# Patient Record
Sex: FEMALE | Race: WHITE | NOT HISPANIC OR LATINO | Employment: OTHER | URBAN - METROPOLITAN AREA
[De-identification: names, ages, dates, MRNs, and addresses within clinical notes are randomized per-mention and may not be internally consistent; named-entity substitution may affect disease eponyms.]

---

## 2019-05-13 ENCOUNTER — LAB REQUISITION (OUTPATIENT)
Dept: LAB | Facility: HOSPITAL | Age: 84
End: 2019-05-13

## 2019-05-13 DIAGNOSIS — Z00.00 ROUTINE GENERAL MEDICAL EXAMINATION AT A HEALTH CARE FACILITY: ICD-10-CM

## 2019-05-13 LAB
BASOPHILS # BLD AUTO: 0.05 10*3/MM3 (ref 0–0.2)
BASOPHILS NFR BLD AUTO: 0.8 % (ref 0–1.5)
DEPRECATED RDW RBC AUTO: 46.7 FL (ref 37–54)
EOSINOPHIL # BLD AUTO: 0.27 10*3/MM3 (ref 0–0.4)
EOSINOPHIL NFR BLD AUTO: 4.3 % (ref 0.3–6.2)
ERYTHROCYTE [DISTWIDTH] IN BLOOD BY AUTOMATED COUNT: 13.1 % (ref 12.3–15.4)
HCT VFR BLD AUTO: 38.5 % (ref 34–46.6)
HGB BLD-MCNC: 12 G/DL (ref 12–15.9)
IMM GRANULOCYTES # BLD AUTO: 0.03 10*3/MM3 (ref 0–0.05)
IMM GRANULOCYTES NFR BLD AUTO: 0.5 % (ref 0–0.5)
LYMPHOCYTES # BLD AUTO: 1.58 10*3/MM3 (ref 0.7–3.1)
LYMPHOCYTES NFR BLD AUTO: 25 % (ref 19.6–45.3)
MCH RBC QN AUTO: 30.2 PG (ref 26.6–33)
MCHC RBC AUTO-ENTMCNC: 31.2 G/DL (ref 31.5–35.7)
MCV RBC AUTO: 97 FL (ref 79–97)
MONOCYTES # BLD AUTO: 0.53 10*3/MM3 (ref 0.1–0.9)
MONOCYTES NFR BLD AUTO: 8.4 % (ref 5–12)
NEUTROPHILS # BLD AUTO: 3.85 10*3/MM3 (ref 1.7–7)
NEUTROPHILS NFR BLD AUTO: 61 % (ref 42.7–76)
NRBC BLD AUTO-RTO: 0 /100 WBC (ref 0–0.2)
PLATELET # BLD AUTO: 392 10*3/MM3 (ref 140–450)
PMV BLD AUTO: 9 FL (ref 6–12)
RBC # BLD AUTO: 3.97 10*6/MM3 (ref 3.77–5.28)
WBC NRBC COR # BLD: 6.31 10*3/MM3 (ref 3.4–10.8)

## 2019-05-13 PROCEDURE — 85025 COMPLETE CBC W/AUTO DIFF WBC: CPT

## 2021-10-15 ENCOUNTER — APPOINTMENT (OUTPATIENT)
Dept: CT IMAGING | Facility: HOSPITAL | Age: 86
End: 2021-10-15

## 2021-10-15 ENCOUNTER — HOSPITAL ENCOUNTER (INPATIENT)
Facility: HOSPITAL | Age: 86
LOS: 4 days | Discharge: NURSING, REHAB OR LONG-TERM CARE | End: 2021-10-19
Attending: EMERGENCY MEDICINE | Admitting: INTERNAL MEDICINE

## 2021-10-15 DIAGNOSIS — Z86.73 HISTORY OF CVA (CEREBROVASCULAR ACCIDENT): ICD-10-CM

## 2021-10-15 DIAGNOSIS — I63.81 CEREBROVASCULAR ACCIDENT (CVA) DUE TO OCCLUSION OF SMALL ARTERY (HCC): Primary | ICD-10-CM

## 2021-10-15 DIAGNOSIS — I67.1 INTRACRANIAL ANEURYSM: ICD-10-CM

## 2021-10-15 PROBLEM — I10 HTN (HYPERTENSION): Status: ACTIVE | Noted: 2021-10-15

## 2021-10-15 LAB
ALBUMIN SERPL-MCNC: 4.8 G/DL (ref 3.5–5.2)
ALBUMIN/GLOB SERPL: 1.8 G/DL
ALP SERPL-CCNC: 73 U/L (ref 39–117)
ALT SERPL W P-5'-P-CCNC: 15 U/L (ref 1–33)
ANION GAP SERPL CALCULATED.3IONS-SCNC: 12 MMOL/L (ref 5–15)
AST SERPL-CCNC: 17 U/L (ref 1–32)
BASOPHILS # BLD AUTO: 0.02 10*3/MM3 (ref 0–0.2)
BASOPHILS NFR BLD AUTO: 0.2 % (ref 0–1.5)
BILIRUB SERPL-MCNC: 0.3 MG/DL (ref 0–1.2)
BUN SERPL-MCNC: 13 MG/DL (ref 8–23)
BUN/CREAT SERPL: 14.1 (ref 7–25)
CALCIUM SPEC-SCNC: 9.5 MG/DL (ref 8.2–9.6)
CHLORIDE SERPL-SCNC: 101 MMOL/L (ref 98–107)
CO2 SERPL-SCNC: 25 MMOL/L (ref 22–29)
CREAT SERPL-MCNC: 0.92 MG/DL (ref 0.57–1)
DEPRECATED RDW RBC AUTO: 42.1 FL (ref 37–54)
EOSINOPHIL # BLD AUTO: 0.02 10*3/MM3 (ref 0–0.4)
EOSINOPHIL NFR BLD AUTO: 0.2 % (ref 0.3–6.2)
ERYTHROCYTE [DISTWIDTH] IN BLOOD BY AUTOMATED COUNT: 13 % (ref 12.3–15.4)
GFR SERPL CREATININE-BSD FRML MDRD: 57 ML/MIN/1.73
GLOBULIN UR ELPH-MCNC: 2.7 GM/DL
GLUCOSE BLDC GLUCOMTR-MCNC: 110 MG/DL (ref 70–130)
GLUCOSE SERPL-MCNC: 111 MG/DL (ref 65–99)
HCT VFR BLD AUTO: 39.2 % (ref 34–46.6)
HGB BLD-MCNC: 12.8 G/DL (ref 12–15.9)
IMM GRANULOCYTES # BLD AUTO: 0.02 10*3/MM3 (ref 0–0.05)
IMM GRANULOCYTES NFR BLD AUTO: 0.2 % (ref 0–0.5)
INR PPP: 0.92 (ref 0.9–1.1)
LYMPHOCYTES # BLD AUTO: 0.93 10*3/MM3 (ref 0.7–3.1)
LYMPHOCYTES NFR BLD AUTO: 11.2 % (ref 19.6–45.3)
MCH RBC QN AUTO: 29.5 PG (ref 26.6–33)
MCHC RBC AUTO-ENTMCNC: 32.7 G/DL (ref 31.5–35.7)
MCV RBC AUTO: 90.3 FL (ref 79–97)
MONOCYTES # BLD AUTO: 0.36 10*3/MM3 (ref 0.1–0.9)
MONOCYTES NFR BLD AUTO: 4.3 % (ref 5–12)
NEUTROPHILS NFR BLD AUTO: 6.94 10*3/MM3 (ref 1.7–7)
NEUTROPHILS NFR BLD AUTO: 83.9 % (ref 42.7–76)
NRBC BLD AUTO-RTO: 0 /100 WBC (ref 0–0.2)
PLATELET # BLD AUTO: 352 10*3/MM3 (ref 140–450)
PMV BLD AUTO: 8.9 FL (ref 6–12)
POTASSIUM SERPL-SCNC: 4 MMOL/L (ref 3.5–5.2)
PROT SERPL-MCNC: 7.5 G/DL (ref 6–8.5)
PROTHROMBIN TIME: 12.2 SECONDS (ref 11.7–14.2)
QT INTERVAL: 389 MS
RBC # BLD AUTO: 4.34 10*6/MM3 (ref 3.77–5.28)
SARS-COV-2 RNA RESP QL NAA+PROBE: NOT DETECTED
SODIUM SERPL-SCNC: 138 MMOL/L (ref 136–145)
TROPONIN T SERPL-MCNC: <0.01 NG/ML (ref 0–0.03)
WBC # BLD AUTO: 8.29 10*3/MM3 (ref 3.4–10.8)

## 2021-10-15 PROCEDURE — 82962 GLUCOSE BLOOD TEST: CPT

## 2021-10-15 PROCEDURE — 84484 ASSAY OF TROPONIN QUANT: CPT | Performed by: EMERGENCY MEDICINE

## 2021-10-15 PROCEDURE — 93005 ELECTROCARDIOGRAM TRACING: CPT | Performed by: EMERGENCY MEDICINE

## 2021-10-15 PROCEDURE — 80053 COMPREHEN METABOLIC PANEL: CPT | Performed by: EMERGENCY MEDICINE

## 2021-10-15 PROCEDURE — 85025 COMPLETE CBC W/AUTO DIFF WBC: CPT | Performed by: EMERGENCY MEDICINE

## 2021-10-15 PROCEDURE — U0005 INFEC AGEN DETEC AMPLI PROBE: HCPCS | Performed by: EMERGENCY MEDICINE

## 2021-10-15 PROCEDURE — 99285 EMERGENCY DEPT VISIT HI MDM: CPT

## 2021-10-15 PROCEDURE — U0003 INFECTIOUS AGENT DETECTION BY NUCLEIC ACID (DNA OR RNA); SEVERE ACUTE RESPIRATORY SYNDROME CORONAVIRUS 2 (SARS-COV-2) (CORONAVIRUS DISEASE [COVID-19]), AMPLIFIED PROBE TECHNIQUE, MAKING USE OF HIGH THROUGHPUT TECHNOLOGIES AS DESCRIBED BY CMS-2020-01-R: HCPCS | Performed by: EMERGENCY MEDICINE

## 2021-10-15 PROCEDURE — 93010 ELECTROCARDIOGRAM REPORT: CPT | Performed by: INTERNAL MEDICINE

## 2021-10-15 PROCEDURE — 85610 PROTHROMBIN TIME: CPT | Performed by: EMERGENCY MEDICINE

## 2021-10-15 PROCEDURE — 70450 CT HEAD/BRAIN W/O DYE: CPT

## 2021-10-15 RX ORDER — ATORVASTATIN CALCIUM 20 MG/1
10 TABLET, FILM COATED ORAL NIGHTLY
Status: DISCONTINUED | OUTPATIENT
Start: 2021-10-15 | End: 2021-10-19 | Stop reason: HOSPADM

## 2021-10-15 RX ORDER — LEVETIRACETAM 250 MG/1
250 TABLET ORAL 2 TIMES DAILY
COMMUNITY
Start: 2021-09-17

## 2021-10-15 RX ORDER — METOPROLOL SUCCINATE 50 MG/1
50 TABLET, EXTENDED RELEASE ORAL
Status: DISCONTINUED | OUTPATIENT
Start: 2021-10-16 | End: 2021-10-19 | Stop reason: HOSPADM

## 2021-10-15 RX ORDER — AMLODIPINE BESYLATE AND ATORVASTATIN CALCIUM 10; 10 MG/1; MG/1
1 TABLET, FILM COATED ORAL DAILY
COMMUNITY
Start: 2021-09-23 | End: 2021-10-19 | Stop reason: HOSPADM

## 2021-10-15 RX ORDER — POTASSIUM CHLORIDE 750 MG/1
10 TABLET, FILM COATED, EXTENDED RELEASE ORAL DAILY
Status: DISCONTINUED | OUTPATIENT
Start: 2021-10-16 | End: 2021-10-19 | Stop reason: HOSPADM

## 2021-10-15 RX ORDER — ROSUVASTATIN CALCIUM 5 MG/1
5 TABLET, COATED ORAL DAILY
Status: DISCONTINUED | OUTPATIENT
Start: 2021-10-16 | End: 2021-10-15 | Stop reason: SDUPTHER

## 2021-10-15 RX ORDER — SODIUM CHLORIDE 0.9 % (FLUSH) 0.9 %
10 SYRINGE (ML) INJECTION AS NEEDED
Status: DISCONTINUED | OUTPATIENT
Start: 2021-10-15 | End: 2021-10-19 | Stop reason: HOSPADM

## 2021-10-15 RX ORDER — GABAPENTIN 100 MG/1
100 CAPSULE ORAL 3 TIMES DAILY
Status: DISCONTINUED | OUTPATIENT
Start: 2021-10-15 | End: 2021-10-16

## 2021-10-15 RX ORDER — CLOPIDOGREL BISULFATE 75 MG/1
75 TABLET ORAL DAILY
Status: DISCONTINUED | OUTPATIENT
Start: 2021-10-16 | End: 2021-10-19 | Stop reason: HOSPADM

## 2021-10-15 RX ORDER — GABAPENTIN 100 MG/1
200 CAPSULE ORAL 3 TIMES DAILY
Status: ON HOLD | COMMUNITY
Start: 2021-09-24 | End: 2021-10-19 | Stop reason: SDUPTHER

## 2021-10-15 RX ORDER — DIPHENOXYLATE HYDROCHLORIDE AND ATROPINE SULFATE 2.5; .025 MG/1; MG/1
1 TABLET ORAL DAILY
Status: DISCONTINUED | OUTPATIENT
Start: 2021-10-16 | End: 2021-10-19 | Stop reason: HOSPADM

## 2021-10-15 RX ORDER — ONDANSETRON 2 MG/ML
4 INJECTION INTRAMUSCULAR; INTRAVENOUS EVERY 6 HOURS PRN
Status: DISCONTINUED | OUTPATIENT
Start: 2021-10-15 | End: 2021-10-19 | Stop reason: HOSPADM

## 2021-10-15 RX ORDER — SODIUM CHLORIDE 9 MG/ML
75 INJECTION, SOLUTION INTRAVENOUS CONTINUOUS
Status: DISCONTINUED | OUTPATIENT
Start: 2021-10-15 | End: 2021-10-16

## 2021-10-15 RX ORDER — DIPHENOXYLATE HYDROCHLORIDE AND ATROPINE SULFATE 2.5; .025 MG/1; MG/1
1 TABLET ORAL DAILY
COMMUNITY

## 2021-10-15 RX ORDER — ROSUVASTATIN CALCIUM 5 MG/1
5 TABLET, COATED ORAL NIGHTLY
COMMUNITY
Start: 2021-09-28

## 2021-10-15 RX ORDER — ASPIRIN 81 MG/1
81 TABLET ORAL DAILY
Status: DISCONTINUED | OUTPATIENT
Start: 2021-10-16 | End: 2021-10-19 | Stop reason: HOSPADM

## 2021-10-15 RX ORDER — SODIUM CHLORIDE 0.9 % (FLUSH) 0.9 %
10 SYRINGE (ML) INJECTION EVERY 12 HOURS SCHEDULED
Status: DISCONTINUED | OUTPATIENT
Start: 2021-10-15 | End: 2021-10-19 | Stop reason: HOSPADM

## 2021-10-15 RX ORDER — AMLODIPINE BESYLATE 10 MG/1
10 TABLET ORAL NIGHTLY
Status: DISCONTINUED | OUTPATIENT
Start: 2021-10-15 | End: 2021-10-19 | Stop reason: HOSPADM

## 2021-10-15 RX ORDER — ASPIRIN 325 MG
325 TABLET ORAL ONCE
Status: COMPLETED | OUTPATIENT
Start: 2021-10-15 | End: 2021-10-15

## 2021-10-15 RX ORDER — CLOPIDOGREL BISULFATE 75 MG/1
75 TABLET ORAL DAILY
COMMUNITY
Start: 2021-09-20

## 2021-10-15 RX ORDER — LEVETIRACETAM 250 MG/1
250 TABLET ORAL EVERY 12 HOURS SCHEDULED
Status: DISCONTINUED | OUTPATIENT
Start: 2021-10-15 | End: 2021-10-19 | Stop reason: HOSPADM

## 2021-10-15 RX ADMIN — SODIUM CHLORIDE, PRESERVATIVE FREE 10 ML: 5 INJECTION INTRAVENOUS at 21:13

## 2021-10-15 RX ADMIN — ATORVASTATIN CALCIUM 10 MG: 20 TABLET, FILM COATED ORAL at 21:13

## 2021-10-15 RX ADMIN — AMLODIPINE BESYLATE 10 MG: 10 TABLET ORAL at 22:34

## 2021-10-15 RX ADMIN — ASPIRIN 325 MG: 325 TABLET ORAL at 16:17

## 2021-10-15 RX ADMIN — SODIUM CHLORIDE 75 ML/HR: 9 INJECTION, SOLUTION INTRAVENOUS at 21:12

## 2021-10-15 RX ADMIN — GABAPENTIN 100 MG: 100 CAPSULE ORAL at 21:13

## 2021-10-15 NOTE — ED PROVIDER NOTES
" EMERGENCY DEPARTMENT ENCOUNTER    Room Number:  18/18  Date of encounter:  10/15/2021  PCP: Provider, No Known  Historian: Patient, daughter at bedside    I used full protective equipment while examining this patient.  This includes face mask, gloves and protective eyewear.  I washed my hands before entering the room and immediately upon leaving the room      HPI:  Chief Complaint: Right-sided weakness  A complete HPI/ROS/PMH/PSH/SH/FH are unobtainable due to: None    Context: Dionne Chavez is a 89 y.o. female who presents to the ED c/o right-sided weakness.  Patient's had prior stroke with chronic right-sided weakness which is somewhat mild.  Symptoms worsened yesterday somewhere around noon and have gradually gotten worse.  Patient has almost no movement of the right leg and right arm.  Before she was able to walk using a brace and a walker.  Patient has had prior stroke and is on Plavix for anticoagulation.  She did take Plavix earlier today.  She denies headache, chest pain or shortness of breath and has been in her usual health.  Patient was at rehab up until 1 week ago when she came back home and is now living with her daughter who is a nurse at the [facility].      MEDICAL RECORD REVIEW  Patient was hospitalized at [facility] about 5 years ago for stroke.  According to the daughter she had a known intracerebral aneurysm but refused repair at that time given her age.  I did review patient's medications and they do include Plavix for anticoagulation and amlodipine for hypertension.  Patient does have a history of significant intra-abdominal surgery and has a history of \"short gut syndrome\".    PAST MEDICAL HISTORY  Active Ambulatory Problems     Diagnosis Date Noted   • No Active Ambulatory Problems     Resolved Ambulatory Problems     Diagnosis Date Noted   • No Resolved Ambulatory Problems     No Additional Past Medical History         PAST SURGICAL HISTORY  History reviewed. No pertinent surgical " history.      FAMILY HISTORY  History reviewed. No pertinent family history.      SOCIAL HISTORY  Social History     Socioeconomic History   • Marital status:          ALLERGIES  Patient has no allergy information on record.       REVIEW OF SYSTEMS  Review of Systems   Constitutional: Negative for fever.   HENT: Negative.  Negative for sore throat.    Eyes: Negative.    Respiratory: Negative.  Negative for cough.    Cardiovascular: Negative.  Negative for chest pain.   Gastrointestinal: Negative.    Genitourinary: Negative.  Negative for dysuria.   Musculoskeletal: Negative.  Negative for back pain.   Skin: Negative.  Negative for rash.   Neurological: Positive for weakness (As per HPI). Negative for headaches.   All other systems reviewed and are negative.          PHYSICAL EXAM    I have reviewed the triage vital signs and nursing notes.    ED Triage Vitals   Temp Heart Rate Resp BP SpO2   10/15/21 1338 10/15/21 1337 10/15/21 1337 10/15/21 1337 10/15/21 1337   96.9 °F (36.1 °C) 94 18 152/68 99 %      Temp src Heart Rate Source Patient Position BP Location FiO2 (%)   -- -- -- -- --              Physical Exam  GENERAL: Alert female in no obvious distress.  Triage vitals reviewed.  HENT: nares patent, atraumatic  EYES: no scleral icterus  CV: regular rhythm, regular rate no murmur  RESPIRATORY: normal effort, clear to auscultation bilaterally  ABDOMEN: soft, nontender to palpation  MUSCULOSKELETAL: no deformity-no segment swelling or tenderness to palpation  NEURO:   Mentation-awake alert and oriented x3  Speech-coherent and clear, no dysarthria or aphasia  Vision-grossly intact  Strength-moderate weakness of the right upper extremity and severe weakness of the right lower extremity  Sensation-grossly intact  Coordination-intact where not limited by weakness  SKIN: warm, dry      LAB RESULTS  Recent Results (from the past 24 hour(s))   Comprehensive Metabolic Panel    Collection Time: 10/15/21  2:02 PM     Specimen: Blood   Result Value Ref Range    Glucose 111 (H) 65 - 99 mg/dL    BUN 13 8 - 23 mg/dL    Creatinine 0.92 0.57 - 1.00 mg/dL    Sodium 138 136 - 145 mmol/L    Potassium 4.0 3.5 - 5.2 mmol/L    Chloride 101 98 - 107 mmol/L    CO2 25.0 22.0 - 29.0 mmol/L    Calcium 9.5 8.2 - 9.6 mg/dL    Total Protein 7.5 6.0 - 8.5 g/dL    Albumin 4.80 3.50 - 5.20 g/dL    ALT (SGPT) 15 1 - 33 U/L    AST (SGOT) 17 1 - 32 U/L    Alkaline Phosphatase 73 39 - 117 U/L    Total Bilirubin 0.3 0.0 - 1.2 mg/dL    eGFR Non African Amer 57 (L) >60 mL/min/1.73    Globulin 2.7 gm/dL    A/G Ratio 1.8 g/dL    BUN/Creatinine Ratio 14.1 7.0 - 25.0    Anion Gap 12.0 5.0 - 15.0 mmol/L   Protime-INR    Collection Time: 10/15/21  2:02 PM    Specimen: Blood   Result Value Ref Range    Protime 12.2 11.7 - 14.2 Seconds    INR 0.92 0.90 - 1.10   Troponin    Collection Time: 10/15/21  2:02 PM    Specimen: Blood   Result Value Ref Range    Troponin T <0.010 0.000 - 0.030 ng/mL   CBC Auto Differential    Collection Time: 10/15/21  2:02 PM    Specimen: Blood   Result Value Ref Range    WBC 8.29 3.40 - 10.80 10*3/mm3    RBC 4.34 3.77 - 5.28 10*6/mm3    Hemoglobin 12.8 12.0 - 15.9 g/dL    Hematocrit 39.2 34.0 - 46.6 %    MCV 90.3 79.0 - 97.0 fL    MCH 29.5 26.6 - 33.0 pg    MCHC 32.7 31.5 - 35.7 g/dL    RDW 13.0 12.3 - 15.4 %    RDW-SD 42.1 37.0 - 54.0 fl    MPV 8.9 6.0 - 12.0 fL    Platelets 352 140 - 450 10*3/mm3    Neutrophil % 83.9 (H) 42.7 - 76.0 %    Lymphocyte % 11.2 (L) 19.6 - 45.3 %    Monocyte % 4.3 (L) 5.0 - 12.0 %    Eosinophil % 0.2 (L) 0.3 - 6.2 %    Basophil % 0.2 0.0 - 1.5 %    Immature Grans % 0.2 0.0 - 0.5 %    Neutrophils, Absolute 6.94 1.70 - 7.00 10*3/mm3    Lymphocytes, Absolute 0.93 0.70 - 3.10 10*3/mm3    Monocytes, Absolute 0.36 0.10 - 0.90 10*3/mm3    Eosinophils, Absolute 0.02 0.00 - 0.40 10*3/mm3    Basophils, Absolute 0.02 0.00 - 0.20 10*3/mm3    Immature Grans, Absolute 0.02 0.00 - 0.05 10*3/mm3    nRBC 0.0 0.0 - 0.2 /100 WBC    COVID-19, [facility] IN-HOUSE CEPHEID/SIERRA NP SWAB IN TRANSPORT MEDIA 8-12 HR TAT - Swab, Nasopharynx    Collection Time: 10/15/21  2:02 PM    Specimen: Nasopharynx; Swab   Result Value Ref Range    COVID19 Not Detected Not Detected - Ref. Range   ECG 12 Lead    Collection Time: 10/15/21  2:19 PM   Result Value Ref Range    QT Interval 389 ms       Ordered the above labs and independently reviewed the results.      RADIOLOGY  CT Head Without Contrast    Result Date: 10/15/2021  CT HEAD WITHOUT CONTRAST  CLINICAL HISTORY: Acute onset right-sided weakness.  TECHNIQUE: CT scan of the head was obtained with 3 mm axial soft tissue algorithm and 2 mm bone algorithm images. No intravenous contrast was administered. Sagittal and coronal reconstructions were obtained.  COMPARISON: No previous similar studies are available for comparison.  FINDINGS:   There is no evidence for a calvarial fracture. There is no evidence for an acute extra-axial hemorrhage.  Within the inferior aspect of the right middle cranial fossa, there is a partially calcified lesion that is likely extra-axial in nature and measures up to approximately 2.7 x 1.4 x 1.5 cm in greatest dimensions that is likely representative of a partially calcified meningioma. Additionally, within the posterior fossa, anterior to the inferior portion of the right cerebellar hemisphere and right lateral to the pontomedullary junction, there is another extra-axial lesion which measures up to approximately 1.7 x 1.0 cm in greatest axial dimensions. This lesion is peripherally calcified and is within the immediate vicinity of the right vertebral artery. I suspect that the findings are representative of a large aneurysm arising from the right vertebral artery, although another potential possibility would be a meningioma at this site. I recommend further evaluation with either a CT angiogram or MRI/MRA study.  The ventricles, sulci, and cisterns are age appropriate. There is a focus  of encephalomalacia within the medial portion of the left frontal lobe which measures up to approximately 6.0 x 5.6 x 3.0 cm in greatest dimensions that is compatible with a chronic infarct within the left BIJAN distribution. There is an age-indeterminate lacune within the left lentiform nucleus which measures up to approximately 9 mm in diameter. There are mild-to-moderate changes of chronic small vessel ischemic phenomena. A chronic lacune is identified within the left caudate body extending to the adjacent corona radiata.  The extracranial structures are incidentally notable for a large sebaceous cyst within the left occipital scalp which measures up to approximately 1.7 x 4.3 cm in greatest axial dimensions.       There is an age-indeterminate lacune within the left lentiform nucleus. Otherwise, there is no convincing evidence to suggest an acute intracranial abnormality.  There are findings compatible with a chronic infarct within the medial portion of the left frontal lobe within the left BIJAN distribution and measures up to approximately 6.0 x 5.6 x 3.0 cm in greatest dimensions. Old lacunar disease is seen within the left caudate extending into the adjacent corona radiata.  There is a partially calcified mass within the inferior aspect of the right middle cranial fossa that is likely extra-axial in nature and likely representative of a partially calcified meningioma. Additionally, within the posterior fossa just anterior to the inferior aspect of the right cerebellar hemisphere along the right lateral aspect of the pontomedullary junction, there is an extra-axial peripherally calcified lesion that measures up to 1.7 x 1.0 cm in greatest axial dimensions. This lesion is within the immediate vicinity of the right vertebral artery and is felt to probably represent a large aneurysm arising from the right vertebral artery, although this could potentially represent a meningioma. Further evaluation could be obtained  with either an MRA/MRA study or a CT angiogram.  Incidental note is made of a large sebaceous cyst within the left occipital scalp.    Radiation dose reduction techniques were utilized, including automated exposure control and exposure modulation based on body size.  This report was finalized on 10/15/2021 4:27 PM by Dr. Alfredo Adame M.D.        I ordered the above noted radiological studies. Reviewed by me and discussed with radiologist.  See dictation for official radiology interpretation.      PROCEDURES  Procedures      MEDICATIONS GIVEN IN ER    Medications   sodium chloride 0.9 % flush 10 mL (has no administration in time range)   aspirin tablet 325 mg (325 mg Oral Given 10/15/21 1617)         PROGRESS, DATA ANALYSIS, CONSULTS, AND MEDICAL DECISION MAKING    All labs have been independently reviewed by me.  All radiology studies have been reviewed by me and discussed with radiologist dictating the report.   EKG's independently viewed and interpreted by me.  Discussion below represents my analysis of pertinent findings related to patient's condition, differential diagnosis, treatment plan and final disposition.      ED Course as of 10/15/21 1642   Fri Oct 15, 2021   [age] HMA-54-rcky-old female with history of prior stroke resulting in right-sided weakness presents with worsening right-sided weakness with onset greater than 24 hours ago.  Patient is not a TPA candidate or thrombectomy candidate due to onset greater than 24 hours.  I do suspect that patient will need to be hospitalized for work-up of new right-sided stroke. [DB]   1402 NIH stroke scale is 4.  3 for right leg, 1 for right arm [DB]   1437 EKG          EKG time: 1419  Rhythm/Rate: Sinus 86  P waves and WV: Normal P waves and WV intervals  QRS, axis: Normal axis, normal QRS  ST and T waves: Unremarkable ST and T waves    Interpreted Contemporaneously by me, independently viewed  No prior to compare   [DB]   1447 Labs are reviewed and are all  unremarkable.  Chemistries troponin and CBC fall within normal limits.  At this point we are waiting on CT scan of the head. [DB]   1608 I discussed CT scan of the head with Dr. Cerda.  He reports likely left lacunar infarct.  There is an old left cortical infarct.  There is a meningioma.  There is a large area near the left vertebral artery consistent with likely aneurysm.  Patient's daughter does report the patient does have a known aneurysm from 5 years ago that she did not want fixed with any intervention.  I shared results of testing with patient and her daughter.  Plan admission to the hospital.  Patient is already taken Plavix but will go ahead and give aspirin 325 per stroke protocol. [DB]   1641 Discussed evaluation of this patient with Dr. Childers who admit on behalf of [facility]. 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

## 2021-10-15 NOTE — ED TRIAGE NOTES
Pt from home comes to ER via [facility] pt has rt sided weakness arm and leg started started unknown time yesterday. Pt has previous stroke with weakness on rt side states weakness is worse.     Pt arrives in triage with mask on. Triage staff wearing N95 masks and goggles.

## 2021-10-15 NOTE — ED NOTES
This RN wearing all appropriate PPE during patient encounter. Hand hygiene performed before and during entering room.       Nadia Hernandez, STELLA  10/15/21 6153

## 2021-10-16 ENCOUNTER — APPOINTMENT (OUTPATIENT)
Dept: CARDIOLOGY | Facility: HOSPITAL | Age: 86
End: 2021-10-16

## 2021-10-16 PROBLEM — G40.909 SEIZURE DISORDER (HCC): Chronic | Status: ACTIVE | Noted: 2021-10-16

## 2021-10-16 LAB
ALBUMIN SERPL-MCNC: 4.3 G/DL (ref 3.5–5.2)
ALBUMIN/GLOB SERPL: 1.7 G/DL
ALP SERPL-CCNC: 63 U/L (ref 39–117)
ALT SERPL W P-5'-P-CCNC: 11 U/L (ref 1–33)
ANION GAP SERPL CALCULATED.3IONS-SCNC: 10.1 MMOL/L (ref 5–15)
AORTIC DIMENSIONLESS INDEX: 0.6 (DI)
AST SERPL-CCNC: 16 U/L (ref 1–32)
BH CV ECHO MEAS - ACS: 1.7 CM
BH CV ECHO MEAS - AO MAX PG (FULL): 6.2 MMHG
BH CV ECHO MEAS - AO MAX PG: 8.8 MMHG
BH CV ECHO MEAS - AO MEAN PG (FULL): 4 MMHG
BH CV ECHO MEAS - AO MEAN PG: 5 MMHG
BH CV ECHO MEAS - AO ROOT AREA (BSA CORRECTED): 1.1
BH CV ECHO MEAS - AO ROOT AREA: 3.1 CM^2
BH CV ECHO MEAS - AO ROOT DIAM: 2 CM
BH CV ECHO MEAS - AO V2 MAX: 148 CM/SEC
BH CV ECHO MEAS - AO V2 MEAN: 102 CM/SEC
BH CV ECHO MEAS - AO V2 VTI: 27.8 CM
BH CV ECHO MEAS - AVA(I,A): 1.9 CM^2
BH CV ECHO MEAS - AVA(I,D): 1.9 CM^2
BH CV ECHO MEAS - AVA(V,A): 1.9 CM^2
BH CV ECHO MEAS - AVA(V,D): 1.9 CM^2
BH CV ECHO MEAS - BSA(HAYCOCK): 1.8 M^2
BH CV ECHO MEAS - BSA: 1.7 M^2
BH CV ECHO MEAS - BZI_BMI: 27.6 KILOGRAMS/M^2
BH CV ECHO MEAS - BZI_METRIC_HEIGHT: 160 CM
BH CV ECHO MEAS - BZI_METRIC_WEIGHT: 70.8 KG
BH CV ECHO MEAS - EDV(CUBED): 54.9 ML
BH CV ECHO MEAS - EDV(MOD-SP2): 51 ML
BH CV ECHO MEAS - EDV(MOD-SP4): 59 ML
BH CV ECHO MEAS - EDV(TEICH): 62 ML
BH CV ECHO MEAS - EF(CUBED): 74.8 %
BH CV ECHO MEAS - EF(MOD-BP): 61.6 %
BH CV ECHO MEAS - EF(MOD-SP2): 58.8 %
BH CV ECHO MEAS - EF(MOD-SP4): 62.7 %
BH CV ECHO MEAS - EF(TEICH): 67.5 %
BH CV ECHO MEAS - ESV(CUBED): 13.8 ML
BH CV ECHO MEAS - ESV(MOD-SP2): 21 ML
BH CV ECHO MEAS - ESV(MOD-SP4): 22 ML
BH CV ECHO MEAS - ESV(TEICH): 20.2 ML
BH CV ECHO MEAS - FS: 36.8 %
BH CV ECHO MEAS - IVS/LVPW: 1
BH CV ECHO MEAS - IVSD: 1 CM
BH CV ECHO MEAS - LAT PEAK E' VEL: 5.4 CM/SEC
BH CV ECHO MEAS - LV DIASTOLIC VOL/BSA (35-75): 33.9 ML/M^2
BH CV ECHO MEAS - LV MASS(C)D: 117.3 GRAMS
BH CV ECHO MEAS - LV MASS(C)DI: 67.4 GRAMS/M^2
BH CV ECHO MEAS - LV MAX PG: 2.6 MMHG
BH CV ECHO MEAS - LV MEAN PG: 1 MMHG
BH CV ECHO MEAS - LV SYSTOLIC VOL/BSA (12-30): 12.6 ML/M^2
BH CV ECHO MEAS - LV V1 MAX: 80.3 CM/SEC
BH CV ECHO MEAS - LV V1 MEAN: 49.7 CM/SEC
BH CV ECHO MEAS - LV V1 VTI: 15.3 CM
BH CV ECHO MEAS - LVIDD: 3.8 CM
BH CV ECHO MEAS - LVIDS: 2.4 CM
BH CV ECHO MEAS - LVLD AP2: 6.8 CM
BH CV ECHO MEAS - LVLD AP4: 7.1 CM
BH CV ECHO MEAS - LVLS AP2: 5.9 CM
BH CV ECHO MEAS - LVLS AP4: 5.9 CM
BH CV ECHO MEAS - LVOT AREA (M): 3.5 CM^2
BH CV ECHO MEAS - LVOT AREA: 3.5 CM^2
BH CV ECHO MEAS - LVOT DIAM: 2.1 CM
BH CV ECHO MEAS - LVPWD: 1 CM
BH CV ECHO MEAS - MED PEAK E' VEL: 4.7 CM/SEC
BH CV ECHO MEAS - MR MAX PG: 70.9 MMHG
BH CV ECHO MEAS - MR MAX VEL: 421 CM/SEC
BH CV ECHO MEAS - MV A MAX VEL: 106 CM/SEC
BH CV ECHO MEAS - MV DEC SLOPE: 416 CM/SEC^2
BH CV ECHO MEAS - MV DEC TIME: 206 SEC
BH CV ECHO MEAS - MV E MAX VEL: 68.6 CM/SEC
BH CV ECHO MEAS - MV E/A: 0.65
BH CV ECHO MEAS - MV MAX PG: 4.7 MMHG
BH CV ECHO MEAS - MV MEAN PG: 2 MMHG
BH CV ECHO MEAS - MV P1/2T MAX VEL: 89.6 CM/SEC
BH CV ECHO MEAS - MV P1/2T: 63.1 MSEC
BH CV ECHO MEAS - MV V2 MAX: 108 CM/SEC
BH CV ECHO MEAS - MV V2 MEAN: 56.8 CM/SEC
BH CV ECHO MEAS - MV V2 VTI: 23.3 CM
BH CV ECHO MEAS - MVA P1/2T LCG: 2.5 CM^2
BH CV ECHO MEAS - MVA(P1/2T): 3.5 CM^2
BH CV ECHO MEAS - MVA(VTI): 2.3 CM^2
BH CV ECHO MEAS - PA MAX PG (FULL): 1.9 MMHG
BH CV ECHO MEAS - PA MAX PG: 2.8 MMHG
BH CV ECHO MEAS - PA V2 MAX: 84.2 CM/SEC
BH CV ECHO MEAS - RAP SYSTOLE: 8 MMHG
BH CV ECHO MEAS - RV MAX PG: 0.98 MMHG
BH CV ECHO MEAS - RV MEAN PG: 1 MMHG
BH CV ECHO MEAS - RV V1 MAX: 49.6 CM/SEC
BH CV ECHO MEAS - RV V1 MEAN: 34.6 CM/SEC
BH CV ECHO MEAS - RV V1 VTI: 11.6 CM
BH CV ECHO MEAS - RVSP: 31 MMHG
BH CV ECHO MEAS - SI(AO): 50.2 ML/M^2
BH CV ECHO MEAS - SI(CUBED): 23.6 ML/M^2
BH CV ECHO MEAS - SI(LVOT): 30.5 ML/M^2
BH CV ECHO MEAS - SI(MOD-SP2): 17.2 ML/M^2
BH CV ECHO MEAS - SI(MOD-SP4): 21.3 ML/M^2
BH CV ECHO MEAS - SI(TEICH): 24 ML/M^2
BH CV ECHO MEAS - SV(AO): 87.3 ML
BH CV ECHO MEAS - SV(CUBED): 41 ML
BH CV ECHO MEAS - SV(LVOT): 53 ML
BH CV ECHO MEAS - SV(MOD-SP2): 30 ML
BH CV ECHO MEAS - SV(MOD-SP4): 37 ML
BH CV ECHO MEAS - SV(TEICH): 41.8 ML
BH CV ECHO MEAS - TAPSE (>1.6): 2.2 CM
BH CV ECHO MEAS - TR MAX VEL: 238 CM/SEC
BH CV ECHO MEASUREMENTS AVERAGE E/E' RATIO: 13.58
BH CV XLRA - TDI S': 10.4 CM/SEC
BILIRUB SERPL-MCNC: 0.5 MG/DL (ref 0–1.2)
BUN SERPL-MCNC: 11 MG/DL (ref 8–23)
BUN/CREAT SERPL: 12.9 (ref 7–25)
CALCIUM SPEC-SCNC: 8.7 MG/DL (ref 8.2–9.6)
CHLORIDE SERPL-SCNC: 105 MMOL/L (ref 98–107)
CHOLEST SERPL-MCNC: 122 MG/DL (ref 0–200)
CO2 SERPL-SCNC: 25.9 MMOL/L (ref 22–29)
CREAT SERPL-MCNC: 0.85 MG/DL (ref 0.57–1)
DEPRECATED RDW RBC AUTO: 44 FL (ref 37–54)
ERYTHROCYTE [DISTWIDTH] IN BLOOD BY AUTOMATED COUNT: 13.1 % (ref 12.3–15.4)
GFR SERPL CREATININE-BSD FRML MDRD: 63 ML/MIN/1.73
GLOBULIN UR ELPH-MCNC: 2.5 GM/DL
GLUCOSE BLDC GLUCOMTR-MCNC: 98 MG/DL (ref 70–130)
GLUCOSE SERPL-MCNC: 91 MG/DL (ref 65–99)
HBA1C MFR BLD: 5.1 % (ref 4.8–5.6)
HCT VFR BLD AUTO: 38.4 % (ref 34–46.6)
HDLC SERPL-MCNC: 66 MG/DL (ref 40–60)
HGB BLD-MCNC: 12.3 G/DL (ref 12–15.9)
LDLC SERPL CALC-MCNC: 38 MG/DL (ref 0–100)
LDLC/HDLC SERPL: 0.56 {RATIO}
LEFT ATRIUM VOLUME INDEX: 21 ML/M2
MCH RBC QN AUTO: 29.6 PG (ref 26.6–33)
MCHC RBC AUTO-ENTMCNC: 32 G/DL (ref 31.5–35.7)
MCV RBC AUTO: 92.3 FL (ref 79–97)
PLATELET # BLD AUTO: 369 10*3/MM3 (ref 140–450)
PMV BLD AUTO: 9.2 FL (ref 6–12)
POTASSIUM SERPL-SCNC: 4 MMOL/L (ref 3.5–5.2)
PROT SERPL-MCNC: 6.8 G/DL (ref 6–8.5)
RBC # BLD AUTO: 4.16 10*6/MM3 (ref 3.77–5.28)
SINUS: 2.6 CM
SODIUM SERPL-SCNC: 141 MMOL/L (ref 136–145)
TRIGL SERPL-MCNC: 95 MG/DL (ref 0–150)
VLDLC SERPL-MCNC: 18 MG/DL (ref 5–40)
WBC # BLD AUTO: 7.03 10*3/MM3 (ref 3.4–10.8)

## 2021-10-16 PROCEDURE — 82962 GLUCOSE BLOOD TEST: CPT

## 2021-10-16 PROCEDURE — 25010000002 PERFLUTREN (DEFINITY) 8.476 MG IN SODIUM CHLORIDE (PF) 0.9 % 10 ML INJECTION: Performed by: INTERNAL MEDICINE

## 2021-10-16 PROCEDURE — 93306 TTE W/DOPPLER COMPLETE: CPT

## 2021-10-16 PROCEDURE — 97530 THERAPEUTIC ACTIVITIES: CPT

## 2021-10-16 PROCEDURE — 93306 TTE W/DOPPLER COMPLETE: CPT | Performed by: INTERNAL MEDICINE

## 2021-10-16 PROCEDURE — 80053 COMPREHEN METABOLIC PANEL: CPT | Performed by: INTERNAL MEDICINE

## 2021-10-16 PROCEDURE — 97166 OT EVAL MOD COMPLEX 45 MIN: CPT

## 2021-10-16 PROCEDURE — 83036 HEMOGLOBIN GLYCOSYLATED A1C: CPT | Performed by: INTERNAL MEDICINE

## 2021-10-16 PROCEDURE — 80061 LIPID PANEL: CPT | Performed by: INTERNAL MEDICINE

## 2021-10-16 PROCEDURE — 92610 EVALUATE SWALLOWING FUNCTION: CPT

## 2021-10-16 PROCEDURE — 97162 PT EVAL MOD COMPLEX 30 MIN: CPT

## 2021-10-16 PROCEDURE — 99222 1ST HOSP IP/OBS MODERATE 55: CPT | Performed by: PSYCHIATRY & NEUROLOGY

## 2021-10-16 PROCEDURE — 85027 COMPLETE CBC AUTOMATED: CPT | Performed by: INTERNAL MEDICINE

## 2021-10-16 RX ORDER — LANOLIN ALCOHOL/MO/W.PET/CERES
1000 CREAM (GRAM) TOPICAL DAILY
COMMUNITY

## 2021-10-16 RX ORDER — GABAPENTIN 100 MG/1
100 CAPSULE ORAL ONCE
Status: COMPLETED | OUTPATIENT
Start: 2021-10-16 | End: 2021-10-16

## 2021-10-16 RX ORDER — GABAPENTIN 100 MG/1
200 CAPSULE ORAL 3 TIMES DAILY
Status: DISCONTINUED | OUTPATIENT
Start: 2021-10-16 | End: 2021-10-19 | Stop reason: HOSPADM

## 2021-10-16 RX ADMIN — METOPROLOL SUCCINATE 50 MG: 50 TABLET, EXTENDED RELEASE ORAL at 08:55

## 2021-10-16 RX ADMIN — LEVETIRACETAM 250 MG: 250 TABLET, FILM COATED ORAL at 08:55

## 2021-10-16 RX ADMIN — PERFLUTREN 2 ML: 6.52 INJECTION, SUSPENSION INTRAVENOUS at 10:32

## 2021-10-16 RX ADMIN — Medication 1 TABLET: at 08:55

## 2021-10-16 RX ADMIN — GABAPENTIN 100 MG: 100 CAPSULE ORAL at 10:51

## 2021-10-16 RX ADMIN — GABAPENTIN 200 MG: 100 CAPSULE ORAL at 15:38

## 2021-10-16 RX ADMIN — LEVETIRACETAM 250 MG: 250 TABLET, FILM COATED ORAL at 20:38

## 2021-10-16 RX ADMIN — SODIUM CHLORIDE, PRESERVATIVE FREE 10 ML: 5 INJECTION INTRAVENOUS at 08:55

## 2021-10-16 RX ADMIN — SODIUM CHLORIDE, PRESERVATIVE FREE 10 ML: 5 INJECTION INTRAVENOUS at 21:03

## 2021-10-16 RX ADMIN — CLOPIDOGREL 75 MG: 75 TABLET, FILM COATED ORAL at 08:55

## 2021-10-16 RX ADMIN — ASPIRIN 81 MG: 81 TABLET, COATED ORAL at 08:55

## 2021-10-16 RX ADMIN — ATORVASTATIN CALCIUM 10 MG: 20 TABLET, FILM COATED ORAL at 20:38

## 2021-10-16 RX ADMIN — GABAPENTIN 200 MG: 100 CAPSULE ORAL at 20:37

## 2021-10-16 RX ADMIN — LEVETIRACETAM 250 MG: 250 TABLET, FILM COATED ORAL at 00:08

## 2021-10-16 RX ADMIN — GABAPENTIN 100 MG: 100 CAPSULE ORAL at 08:55

## 2021-10-16 RX ADMIN — POTASSIUM CHLORIDE 10 MEQ: 750 TABLET, EXTENDED RELEASE ORAL at 08:55

## 2021-10-16 RX ADMIN — AMLODIPINE BESYLATE 10 MG: 10 TABLET ORAL at 20:38

## 2021-10-16 NOTE — PROGRESS NOTES
Name: Dionne Chavez ADMIT: 10/15/2021   : 1931  PCP: Provider, No Known    MRN: [id number] LOS: 1 days   AGE/SEX: 89 y.o. female  ROOM: [facility]     Subjective   Subjective   Feeling okay today. Right-sided weakness not improved. no HA or visual changes. Tolerating po. Voiding well. No SOA or CP or palp.    Review of Systems   Constitutional: Negative for appetite change, chills, diaphoresis, fatigue and fever.   HENT: Negative for congestion, nosebleeds, sore throat, trouble swallowing and voice change.    Eyes: Negative for pain and visual disturbance.   Respiratory: Negative for cough, choking, chest tightness and shortness of breath.    Cardiovascular: Negative for chest pain, palpitations and leg swelling.   Gastrointestinal: Negative for abdominal pain, blood in stool, diarrhea, nausea and vomiting.   Endocrine: Negative for cold intolerance and heat intolerance.   Genitourinary: Negative for decreased urine volume, difficulty urinating, dysuria and hematuria.   Musculoskeletal: Negative for back pain and neck pain.   Skin: Negative for color change, pallor and rash.   Allergic/Immunologic: Negative for environmental allergies and food allergies.   Neurological: Positive for facial asymmetry and weakness (right sided). Negative for tremors, seizures, syncope, speech difficulty and headaches.   Hematological: Negative for adenopathy. Does not bruise/bleed easily.   Psychiatric/Behavioral: Negative for behavioral problems, confusion and hallucinations.        Objective   Objective   Vital Signs  Temp:  [97.9 °F (36.6 °C)-98.3 °F (36.8 °C)] 98.3 °F (36.8 °C)  Heart Rate:  [68-85] 73  Resp:  [18] 18  BP: (128-164)/(53-85) 128/53  SpO2:  [89 %-97 %] 93 %  on  Flow (L/min):  [2] 2;   Device (Oxygen Therapy): room air  Body mass index is 27.63 kg/m².  Physical Exam  Vitals and nursing note reviewed. Exam conducted with a chaperone present (dtr).   Constitutional:       General: She is not in acute distress.      Appearance: She is not ill-appearing, toxic-appearing or diaphoretic.   HENT:      Head: Normocephalic and atraumatic.      Right Ear: External ear normal.      Left Ear: External ear normal.      Nose: Nose normal.      Mouth/Throat:      Mouth: Mucous membranes are moist.      Pharynx: Oropharynx is clear.   Eyes:      General: No scleral icterus.        Right eye: No discharge.         Left eye: No discharge.      Extraocular Movements: Extraocular movements intact.      Conjunctiva/sclera: Conjunctivae normal.   Cardiovascular:      Rate and Rhythm: Normal rate and regular rhythm.      Pulses: Normal pulses.      Heart sounds: Normal heart sounds.   Pulmonary:      Effort: Pulmonary effort is normal. No respiratory distress.      Breath sounds: Normal breath sounds.   Abdominal:      General: Bowel sounds are normal. There is no distension.      Palpations: Abdomen is soft.      Tenderness: There is no abdominal tenderness.   Musculoskeletal:         General: Swelling (1+ edema in BLEs) present. Normal range of motion.      Cervical back: Neck supple. No rigidity.   Lymphadenopathy:      Cervical: No cervical adenopathy.   Skin:     General: Skin is warm and dry.      Capillary Refill: Capillary refill takes less than 2 seconds.      Coloration: Skin is not jaundiced.      Findings: No rash.   Neurological:      Mental Status: She is alert and oriented to person, place, and time.      Cranial Nerves: No cranial nerve deficit.      Comments: Right sided weakness in UE and LE  Very mild right facial weakness   Psychiatric:         Mood and Affect: Mood normal.         Behavior: Behavior normal.         Thought Content: Thought content normal.      Comments: Very pleasant         Results Review     I reviewed the patient's new clinical results.  Results from last 7 days   Lab Units 10/16/21  0720 10/15/21  1402   WBC 10*3/mm3 7.03 8.29   HEMOGLOBIN g/dL 12.3 12.8   PLATELETS 10*3/mm3 369 352     Results from last  7 days   Lab Units 10/16/21  0720 10/15/21  1402   SODIUM mmol/L 141 138   POTASSIUM mmol/L 4.0 4.0   CHLORIDE mmol/L 105 101   CO2 mmol/L 25.9 25.0   BUN mg/dL 11 13   CREATININE mg/dL 0.85 0.92   GLUCOSE mg/dL 91 111*   Estimated Creatinine Clearance: 41.5 mL/min (by C-G formula based on SCr of 0.85 mg/dL).  Results from last 7 days   Lab Units 10/16/21  0720 10/15/21  1402   ALBUMIN g/dL 4.30 4.80   BILIRUBIN mg/dL 0.5 0.3   ALK PHOS U/L 63 73   AST (SGOT) U/L 16 17   ALT (SGPT) U/L 11 15     Results from last 7 days   Lab Units 10/16/21  0720 10/15/21  1402   CALCIUM mg/dL 8.7 9.5   ALBUMIN g/dL 4.30 4.80       COVID19   Date Value Ref Range Status   10/15/2021 Not Detected Not Detected - Ref. Range Final     Hemoglobin A1C   Date/Time Value Ref Range Status   10/16/2021 0720 5.10 4.80 - 5.60 % Final     Glucose   Date/Time Value Ref Range Status   10/16/2021 0610 98 70 - 130 mg/dL Final     Comment:     Meter: [id number] : [id number] Danielito MATOS   10/15/2021 2039 110 70 - 130 mg/dL Final     Comment:     Meter: [id number] : [id number] MATOS       Adult transthoracic echo complete  · Calculated left ventricular EF = 61.6% Estimated left ventricular EF was   in agreement with the calculated left ventricular EF. Left ventricular   systolic function is normal.  · Left ventricular diastolic function is consistent with (grade I)   impaired relaxation.  · Trace tricuspid valve regurgitation is present.  · Estimated right ventricular systolic pressure from tricuspid   regurgitation is normal (<35 mmHg).       Scheduled Medications  amLODIPine, 10 mg, Oral, Nightly   And  atorvastatin, 10 mg, Oral, Nightly  aspirin, 81 mg, Oral, Daily  clopidogrel, 75 mg, Oral, Daily  gabapentin, 200 mg, Oral, TID  influenza vaccine, 0.5 mL, Intramuscular, Once  levETIRAcetam, 250 mg, Oral, Q12H  metoprolol succinate XL, 50 mg, Oral, Q24H  multivitamin, 1 tablet, Oral, Daily  potassium chloride, 10 mEq, Oral,  Daily  sodium chloride, 10 mL, Intravenous, Q12H    Infusions  sodium chloride, 75 mL/hr, Last Rate: 75 mL/hr (10/15/21 2112)    Diet  Diet Regular; Cardiac       Assessment/Plan     Active Hospital Problems    Diagnosis  POA   • **Cerebrovascular 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 artery (HCC) [I63.81]  Yes   • Seizure disorder (HCC) [G40.909]  Yes   • Aneurysm (HCC) [I72.9]  Yes   • Hyperlipidemia [E78.5]  Yes   • History of CVA (cerebrovascular accident) [Z86.73]  Not Applicable   • HTN (hypertension) [I10]  Yes      Resolved Hospital Problems   No resolved problems to display.       Very pleasant 89yo woman with h/o left-sided CVA with residual right hemiparesis about 5 years ago, who was living in SNF until about a week ago when she moved back into her own home with family. Last week she began to suffer some worsening of her right-sided weakness, but did not tell anyone. She woke from sleep yesterday with her right-sided weakness much worse and then presented to ER.    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  · Full code.  · Discussed with patient, family, nursing staff and consulting provider. D/w Dr. Vincent.  · Anticipate discharge TBD        Jere Floyd MD  [facility]  10/16/21  15:03 EDT

## 2021-10-16 NOTE — THERAPY EVALUATION
Acute Care - Speech Language Pathology   Swallow Initial Evaluation [facility]     Patient Name: Dionne Chavez  : 1931  MRN: [id number]  Today's Date: 10/16/2021               Admit Date: 10/15/2021    Visit Dx:     ICD-10-CM ICD-9-CM   1. 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)   • History of CVA (cerebrovascular accident)   • HTN (hypertension)     Past Medical History:   Diagnosis Date   • Anemia    • Aneurysm (HCC)     brain aneurysm, unruptured   • Arthritis    • Back pain    • Elevated cholesterol    • Hypertension    • Intestinal gangrene (HCC)    • Loose stools    • Right sided weakness    • Stroke (HCC)      Past Surgical History:   Procedure Laterality Date   • APPENDECTOMY     • CHOLECYSTECTOMY     • HIP SURGERY Right    • HYSTERECTOMY         SLP Recommendation and Plan  SLP Swallowing Diagnosis: swallow WFL (10/16/21 1100)  SLP Diet Recommendation: regular textures, thin liquids (10/16/21 1100)  Recommended Precautions and Strategies: upright posture during/after eating, small bites of food and sips of liquid (10/16/21 1100)  SLP Rec. for Method of Medication Administration: meds whole, with thin liquids, with pudding or applesauce, as tolerated (10/16/21 1100)     Monitor for Signs of Aspiration: yes, notify SLP if any concerns (10/16/21 1100)     Swallow Criteria for Skilled Therapeutic Interventions Met: no problems identified which require skilled intervention (10/16/21 1100)  Anticipated Discharge Disposition (SLP): unknown (10/16/21 1100)     Therapy Frequency (Swallow): evaluation only (10/16/21 1100)                            Plan of Care Reviewed With: patient  Outcome Summary: bedside swallow eval completed. no overt s/s of aspiration with thin, puree, mech soft, reg solids.  mastication WFL. REC: reg diet/thins, meds with thin or puree as tolerated, up at 90', small bites/drinks. SLP to s/o.      SWALLOW EVALUATION (last 72 hours)     SLP Adult Swallow Evaluation     Row Name 10/16/21 1100                   Rehab Evaluation    Document Type evaluation  -LT        Subjective Information no complaints  -LT        Patient Observations alert; cooperative; agree to therapy  -LT        Care Plan Review evaluation/treatment results reviewed; care plan/treatment goals reviewed; risks/benefits reviewed; current/potential barriers reviewed; patient/other agree to care plan  -LT        Patient Effort good  -LT        Symptoms Noted During/After Treatment none  -LT                  General Information    Patient Profile Reviewed yes  -LT        Pertinent History Of Current Problem 89 yo F w/CVA, hx of CVA w/R side weakness.  -LT        Current Method of Nutrition regular textures; thin liquids  -LT        Prior Level of Function-Swallowing no diet consistency restrictions  -LT        Plans/Goals Discussed with patient; agreed upon  -LT        Barriers to Rehab none identified  -LT        Patient's Goals for Discharge patient did not state  -LT                  Oral Motor Structure and Function    Dentition Assessment natural, present and adequate  -LT        Secretion Management WNL/WFL  -LT        Volitional Swallow WFL  -LT        Volitional Cough WFL  -LT                  Oral Musculature and Cranial Nerve Assessment    Oral Motor General Assessment WFL  -LT                  Clinical Swallow Eval    Clinical Swallow Evaluation Summary bedside swallow eval completed. no overt s/s of aspiration with thin, puree, mech soft, reg solids. mastication WFL. REC: reg diet/thins, meds with thin or puree as tolerated, up at 90', small bites/drinks. SLP to s/o.  -LT                  Clinical Impression    SLP Swallowing Diagnosis swallow WFL  -LT        Functional Impact no impact on function  -LT         Swallow Criteria for Skilled Therapeutic Interventions Met no problems identified which require skilled intervention  -LT                  Recommendations    Therapy Frequency (Swallow) evaluation only  -LT        SLP Diet Recommendation regular textures; thin liquids  -LT        Recommended Precautions and Strategies upright posture during/after eating; small bites of food and sips of liquid  -LT        Oral Care Recommendations Oral Care BID/PRN  -LT        SLP Rec. for Method of Medication Administration meds whole; with thin liquids; with pudding or applesauce; as tolerated  -LT        Monitor for Signs of Aspiration yes; notify SLP if any concerns  -LT        Anticipated Discharge Disposition (SLP) unknown  -LT              User Key  (r) = Recorded By, (t) = Taken By, (c) = Cosigned By    Initials Name Effective Dates    LT Gisel Harrell MS CCC-SLP 06/16/21 -                 EDUCATION  The patient has been educated in the following areas:   Dysphagia (Swallowing Impairment).         SLP Outcome Measures (last 72 hours)     SLP Outcome Measures     Row Name 10/16/21 1100             SLP Outcome Measures    Outcome Measure Used? Adult NOMS  -LT              Adult FCM Scores    FCM Chosen Swallowing  -LT      Swallowing FCM Score 7  -LT            User Key  (r) = Recorded By, (t) = Taken By, (c) = Cosigned By    Initials Name Effective Dates    LT Gisel Harrell MS CCC-SLP 06/16/21 -                  Time Calculation:    Time Calculation- SLP     Row Name 10/16/21 1144             Time Calculation- SLP    SLP Received On 10/16/21  -LT            User Key  (r) = Recorded By, (t) = Taken By, (c) = Cosigned By    Initials Name Provider Type    LT Gisel Harrell MS CCC-SLP Speech and Language Pathologist                Therapy Charges for Today     Code Description Service Date Service Provider Modifiers Qty    42161312296  ST EVAL ORAL PHARYNG SWALLOW 3 10/16/2021 Gisel Harrell MS CCC-SLP GN 1                Gisel Harrell MS CCC-SLP  10/16/2021

## 2021-10-16 NOTE — PLAN OF CARE
Goal Outcome Evaluation:  Plan of Care Reviewed With: patient        Progress: improving  Outcome Summary: Patient is a 89 yo female who presentedw tih R sided weakness UE/LE. Workup for acute CVA. MRI not completed at this time. Pt lived in [place] for the last 2 years but one week ago moved back into her home alone. Pt reports ramp to enter home and was ind with use fo walker at baseline. She has AFO for RLE. Pt presents today with decreased RLE/UE strength, ROM and UE/LE coordination. Pt required modAx2 for supine to sitting and bed to chair transfer using rwx. Pt completed STS to rwx with min-modAx2. Pt unable to advance RLE/LLE during transfer. Cues for safety and assist to move rwx during tx. Pt educated regarding UE/LE and  strength exercises.  Pt will benefit from skilled PT to address functional deficits. Anticipate SNF at discharge.    Patient was not wearing a face mask during this therapy encounter. Therapist used appropriate personal protective equipment including eye protection, mask, and gloves.  Mask used was standard procedure mask. Appropriate PPE was worn during the entire therapy session. Hand hygiene was completed before and after therapy session. Patient is not in enhanced droplet precautions.  Mikhail present.

## 2021-10-16 NOTE — PLAN OF CARE
Goal Outcome Evaluation:  Plan of Care Reviewed With: patient        Progress: improving  Outcome Summary: Pt seen by OT this date for evaluation. Pt is a 89 y/o female admit with R side weakness with a workup for acute CVA. Upon arrival pt lying supine in bed, A&O, no c/o pain. Pt agreeable to sit EOB with Min A but required Mod A to return to supine. Pt able to assist in scooting in bed with LUE/LLE. Pt requiring Mod A to don socks on BLEs this date sitting EOB with decrease sitting balance noted. Pt performed sit>stand transition with use of rolling walker and Mod A. Pt to benefit from skilled OT services to address goals and deficits. OT rec SNF at D/C. OT wore mask, gloves, glasses, hand hygiene performed.

## 2021-10-16 NOTE — PLAN OF CARE
Goal Outcome Evaluation:  Plan of Care Reviewed With: patient           Outcome Summary: bedside swallow eval completed. no overt s/s of aspiration with thin, puree, mech soft, reg solids. mastication WFL. REC: reg diet/thins, meds with thin or puree as tolerated, up at 90', small bites/drinks. SLP to s/o.

## 2021-10-16 NOTE — THERAPY EVALUATION
Patient Name: Dionne Chavez  : 1931    MRN: [id number]                              Today's Date: 10/16/2021       Admit Date: 10/15/2021    Visit Dx:     ICD-10-CM ICD-9-CM   1. 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)   • History of CVA (cerebrovascular accident)   • HTN (hypertension)   • Aneurysm (HCC)   • Hyperlipidemia   • Seizure disorder (HCC)     Past Medical History:   Diagnosis Date   • Anemia    • Aneurysm (HCC)     brain aneurysm, unruptured   • Arthritis    • Back pain    • Elevated cholesterol    • Hypertension    • Intestinal gangrene (HCC)    • Loose stools    • Right sided weakness    • Stroke (HCC)      Past Surgical History:   Procedure Laterality Date   • APPENDECTOMY     • CHOLECYSTECTOMY     • HIP SURGERY Right    • HYSTERECTOMY        General Information     Row Name 10/16/21 1605          OT Time and Intention    Document Type evaluation  -BL     Mode of Treatment individual therapy; occupational therapy  -     Row Name 10/16/21 1605          General Information    Patient Profile Reviewed yes  -BL     Prior Level of Function independent:; ADL's; feeding; grooming; dressing; bathing  -BL     Existing Precautions/Restrictions fall  -BL     Barriers to Rehab medically complex; cognitive status  -BL     Row Name 10/16/21 160          Living Environment    Lives With alone  -BL     Row Name 10/16/21 1605          Cognition    Orientation Status (Cognition) oriented x 3  -BL     Row Name 10/16/21 1605          Safety Issues, Functional Mobility    Safety Issues Affecting Function (Mobility) insight into deficits/self-awareness; awareness of need for assistance; judgment; sequencing abilities; safety precaution awareness; safety precautions follow-through/compliance  -BL     Impairments  Affecting Function (Mobility) balance; coordination; endurance/activity tolerance; strength; postural/trunk control; range of motion (ROM); motor control; motor planning  -           User Key  (r) = Recorded By, (t) = Taken By, (c) = Cosigned By    Initials Name Provider Type    BL Roe Wong OT Occupational Therapist                 Mobility/ADL's     Row Name 10/16/21 1608          Bed Mobility    Bed Mobility supine-sit; sit-supine; scooting/bridging  -     Scooting/Bridging [place] (Bed Mobility) moderate assist (50% patient effort); verbal cues; nonverbal cues (demo/gesture)  -     Supine-Sit [place] (Bed Mobility) minimum assist (75% patient effort); verbal cues  -     Sit-Supine [place] (Bed Mobility) moderate assist (50% patient effort); verbal cues  -     Assistive Device (Bed Mobility) bed rails; head of bed elevated; draw sheet  -     Row Name 10/16/21 1608          Transfers    Transfers sit-stand transfer  -     Comment (Transfers) Pt performed sit>stand transition with Min A.  -     Sit-Stand [place] (Transfers) minimum assist (75% patient effort); verbal cues  -     Row Name 10/16/21 1608          Sit-Stand Transfer    Assistive Device (Sit-Stand Transfers) walker, front-wheeled  -     Row Name 10/16/21 1608          Activities of Daily Living    BADL Assessment/Intervention lower body dressing  -     Row Name 10/16/21 1608          Lower Body Dressing Assessment/Training    [place] Level (Lower Body Dressing) moderate assist (50% patient effort); don; socks; doff  -     Position (Lower Body Dressing) edge of bed sitting  -           User Key  (r) = Recorded By, (t) = Taken By, (c) = Cosigned By    Initials Name Provider Type    BL Roe Wong OT Occupational Therapist               Obj/Interventions     Row Name 10/16/21 1613          Sensory Assessment (Somatosensory)    Sensory Assessment (Somatosensory) UE sensation intact  -      Row Name 10/16/21 1613          Vision Assessment/Intervention    Visual Impairment/Limitations WFL  -BL     Row Name 10/16/21 1613          Range of Motion Comprehensive    General Range of Motion bilateral upper extremity ROM WFL  -BL     Row Name 10/16/21 1613          Strength Comprehensive (MMT)    Comment, General Manual Muscle Testing (MMT) Assessment BUE 2+/5  -BL     Row Name 10/16/21 1613          Balance    Balance Assessment sitting static balance; sitting dynamic balance; sit to stand dynamic balance  -BL     Static Sitting Balance WFL; unsupported; sitting, edge of bed  -BL     Dynamic Sitting Balance mild impairment; unsupported; sitting, edge of bed  -BL     Sit to Stand Dynamic Balance mild impairment; supported; standing  -BL           User Key  (r) = Recorded By, (t) = Taken By, (c) = Cosigned By    Initials Name Provider Type    Roe Campuzano, OT Occupational Therapist               Goals/Plan     Row Name 10/16/21 1620          Bed Mobility Goal 1 (OT)    Activity/Assistive Device (Bed Mobility Goal 1, OT) bed mobility activities, all  -BL     [place] Level/Cues Needed (Bed Mobility Goal 1, OT) contact guard assist  -BL     Time Frame (Bed Mobility Goal 1, OT) short term goal (STG); 2 weeks  -BL     Progress/Outcomes (Bed Mobility Goal 1, OT) continuing progress toward goal  -BL     Row Name 10/16/21 1620          Transfer Goal 1 (OT)    Activity/Assistive Device (Transfer Goal 1, OT) transfers, all  -BL     [place] Level/Cues Needed (Transfer Goal 1, OT) contact guard assist  -BL     Time Frame (Transfer Goal 1, OT) short term goal (STG); 2 weeks  -BL     Progress/Outcome (Transfer Goal 1, OT) continuing progress toward goal  -BL     Row Name 10/16/21 1620          Dressing Goal 1 (OT)    Activity/Device (Dressing Goal 1, OT) dressing skills, all  -BL     [place]/Cues Needed (Dressing Goal 1, OT) contact guard assist  -BL     Time Frame (Dressing Goal 1, OT) short term goal  (STG); 2 weeks  -BL     Progress/Outcome (Dressing Goal 1, OT) continuing progress toward goal  -BL     Row Name 10/16/21 1620          Toileting Goal 1 (OT)    Activity/Device (Toileting Goal 1, OT) toileting skills, all  -BL     [place] Level/Cues Needed (Toileting Goal 1, OT) minimum assist (75% or more patient effort)  -BL     Time Frame (Toileting Goal 1, OT) short term goal (STG); 2 weeks  -BL     Progress/Outcome (Toileting Goal 1, OT) continuing progress toward goal  -BL     Row Name 10/16/21 1620          Strength Goal 1 (OT)    Strength Goal 1 (OT) Pt will be independent with HEP prior to D/C.  -BL     Time Frame (Strength Goal 1, OT) short term goal (STG); 2 weeks  -BL     Progress/Outcome (Strength Goal 1, OT) continuing progress toward goal  -BL     Row Name 10/16/21 1620          Therapy Assessment/Plan (OT)    Planned Therapy Interventions (OT) activity tolerance training; occupation/activity based interventions; IADL retraining; BADL retraining; passive ROM/stretching; strengthening exercise; ROM/therapeutic exercise; transfer/mobility retraining; patient/caregiver education/training  -BL           User Key  (r) = Recorded By, (t) = Taken By, (c) = Cosigned By    Initials Name Provider Type    BL Roe Wong OT Occupational Therapist               Clinical Impression     [facility] Name 10/16/21 1615          Pain Assessment    Additional Documentation Pain Scale: Numbers Pre/Post-Treatment (Group)  -BL     Row Name 10/16/21 1615          Pain Scale: Numbers Pre/Post-Treatment    Pretreatment Pain Rating 0/10 - no pain  -     Posttreatment Pain Rating 0/10 - no pain  -BL     Row Name 10/16/21 1615          Plan of Care Review    Plan of Care Reviewed With patient  -BL     Progress improving  -BL     Outcome Summary Pt seen by OT this date for evaluation. Pt is a 89 y/o female admit with R side weakness with a workup for acute CVA. Upon arrival pt lying supine in bed, A&O, no c/o pain. Pt  agreeable to sit EOB with Min A but required Mod A to return to supine. Pt able to assist in scooting in bed with LUE/LLE. Pt requiring Mod A to don socks on BLEs this date sitting EOB with decrease sitting balance noted. Pt performed sit>stand transition with use of rolling walker and Mod A. Pt to benefit from skilled OT services to address goals and deficits. OT rec SNF at D/C. OT wore mask, gloves, glasses, hand hygiene performed.  -     Row Name 10/16/21 1615          Therapy Assessment/Plan (OT)    Rehab Potential (OT) good, to achieve stated therapy goals  -     Criteria for Skilled Therapeutic Interventions Met (OT) yes; skilled treatment is necessary  -BL     Therapy Frequency (OT) 5 times/wk  -BL     Row Name 10/16/21 1615          Therapy Plan Review/Discharge Plan (OT)    Anticipated Discharge Disposition (OT) [facility] nursing facility  -     Row Name 10/16/21 1615          Vital Signs    Pre Patient Position Supine  -BL     Intra Patient Position Standing  -BL     Post Patient Position Supine  -BL     Row Name 10/16/21 161          Positioning and Restraints    Pre-Treatment Position in bed  -BL     Post Treatment Position bed  -BL     In Bed supine; call light within reach; encouraged to call for assist; exit alarm on  -BL           User Key  (r) = Recorded By, (t) = Taken By, (c) = Cosigned By    Initials Name Provider Type    BL Roe Wong, OT Occupational Therapist               Outcome Measures     Row Name 10/16/21 1620          How much help from another is currently needed...    Putting on and taking off regular lower body clothing? 2  -BL     Bathing (including washing, rinsing, and drying) 2  -BL     Toileting (which includes using toilet bed pan or urinal) 1  -BL     Putting on and taking off regular upper body clothing 2  -BL     Taking care of personal grooming (such as brushing teeth) 3  -BL     Eating meals 3  -BL     AM-PAC 6 Clicks Score (OT) 13  -BL     Row Name 10/16/21  1313          How much help from another person do you currently need...    Turning from your back to your side while in flat bed without using bedrails? 2  -CB     Moving from lying on back to sitting on the side of a flat bed without bedrails? 2  -CB     Moving to and from a bed to a chair (including a wheelchair)? 2  -CB     Standing up from a chair using your arms (e.g., wheelchair, bedside chair)? 2  -CB     Climbing 3-5 steps with a railing? 1  -CB     To walk in hospital room? 1  -CB     AM-PAC 6 Clicks Score (PT) 10  -CB     Row Name 10/16/21 1621 10/16/21 1313       Modified [place] Scale    Modified [place] Scale 4 - Moderately severe disability.  Unable to walk without assistance, and unable to attend to own bodily needs without assistance.  -BL 4 - Moderately severe disability.  Unable to walk without assistance, and unable to attend to own bodily needs without assistance.  -CB    Row Name 10/16/21 1621 10/16/21 1313       Functional Assessment    Outcome Measure Options AM-PAC 6 Clicks Daily Activity (OT); Modified [place]  -BL AM-PAC 6 Clicks Basic Mobility (PT); Modified [place]  -CB          User Key  (r) = Recorded By, (t) = Taken By, (c) = Cosigned By    Initials Name Provider Type    Roe Campuzano OT Occupational Therapist    Carlee Burrell Physical Therapist                Occupational Therapy Education                 Title: PT OT SLP Therapies (In Progress)     Topic: Occupational Therapy (In Progress)     Point: ADL training (Done)     Description:   Instruct learner(s) on proper safety adaptation and remediation techniques during self care or transfers.   Instruct in proper use of assistive devices.              Learning Progress Summary           Patient Acceptance, E, VU by CJ at 10/16/2021 1622    Comment: The role of OT                   Point: Home exercise program (Not Started)     Description:   Instruct learner(s) on appropriate technique for monitoring, assisting and/or  progressing therapeutic exercises/activities.              Learner Progress:  Not documented in this visit.          Point: Precautions (Not Started)     Description:   Instruct learner(s) on prescribed precautions during self-care and functional transfers.              Learner Progress:  Not documented in this visit.          Point: Body mechanics (Not Started)     Description:   Instruct learner(s) on proper positioning and spine alignment during self-care, functional mobility activities and/or exercises.              Learner Progress:  Not documented in this visit.                      User Key     Initials Effective Dates Name Provider Type Discipline     01/05/21 -  Roe Wong OT Occupational Therapist OT              OT Recommendation and Plan  Planned Therapy Interventions (OT): activity tolerance training, occupation/activity based interventions, IADL retraining, BADL retraining, passive ROM/stretching, strengthening exercise, ROM/therapeutic exercise, transfer/mobility retraining, patient/caregiver education/training  Therapy Frequency (OT): 5 times/wk  Plan of Care Review  Plan of Care Reviewed With: patient  Progress: improving  Outcome Summary: Pt seen by OT this date for evaluation. Pt is a 89 y/o female admit with R side weakness with a workup for acute CVA. Upon arrival pt lying supine in bed, A&O, no c/o pain. Pt agreeable to sit EOB with Min A but required Mod A to return to supine. Pt able to assist in scooting in bed with LUE/LLE. Pt requiring Mod A to don socks on BLEs this date sitting EOB with decrease sitting balance noted. Pt performed sit>stand transition with use of rolling walker and Mod A. Pt to benefit from skilled OT services to address goals and deficits. OT rec SNF at D/C. OT wore mask, gloves, glasses, hand hygiene performed.     Time Calculation:    Time Calculation- OT     Row Name 10/16/21 1622             Time Calculation- OT    OT Start Time 1312  -BL      OT Stop Time  1326  -BL      OT Time Calculation (min) 14 min  -BL      Total Timed Code Minutes- OT 10 minute(s)  -BL      OT Received On 10/16/21  -BL      OT - Next Appointment 10/18/21  -BL      OT Goal Re-Cert Due Date 10/30/21  -BL              Timed Charges    34896 - OT Therapeutic Activity Minutes 10  -BL              Untimed Charges    OT Eval/Re-eval Minutes 4  -BL              Total Minutes    Timed Charges Total Minutes 10  -BL      Untimed Charges Total Minutes 4  -BL       Total Minutes 14  -BL            User Key  (r) = Recorded By, (t) = Taken By, (c) = Cosigned By    Initials Name Provider Type     Roe Wong OT Occupational Therapist              Therapy Charges for Today     Code Description Service Date Service Provider Modifiers Qty    75268641524 HC OT THERAPEUTIC ACT EA 15 MIN 10/16/2021 Roe Wong OT GO 1    [id number] HC OT EVAL MOD COMPLEXITY 2 10/16/2021 Roe Wong OT GO 1               Roe Wong OT  10/16/2021

## 2021-10-16 NOTE — H&P
Internal medicine history and physical  INTERNAL MEDICINE   [facility]       Patient Identification:  Name: Dionne Chavez  Age: 89 y.o.  Sex: female  :  1931  MRN: [id number]                   Primary Care Physician: Provider, No Known                               Date of admission:10/15/2021    Chief Complaint: Increasing right arm and right leg weakness noticed when she attempted to get out of bed earlier.    History of Present Illness:   Patient is a 89-year-old female with prior history of stroke 5 years ago with right-sided weakness and had intracerebral aneurysm detected which was moderate. Because of her refusal at that time, and was residing in the nursing home and rehabilitation facility for 2 years until about a week ago when she moved back to her home with assistance available from her daughter was in her usual state of health when she went to bed last night. She lives with longstanding right-sided weakness but able to function and ambulate with walker using a brace. In this background patient noticed that when she attempted to get out of the bed right side was not functioning and she could not do it. Since then she has been unable to use the right side. EMS was called and patient was brought to the emergency room for further evaluation. Patient is daughter is a nurse who works at [employer]. Work-up in the emergency room revealed age-indeterminate lacune within the left lentiform nucleus otherwise no acute abnormality noted. Patient was noted to have old infarct involving the left frontal lobe and the left anterior communicating artery distribution. She was also noted to have a lesion in the posterior fossa measuring 1.7 x 1 cm it is calcified and concerning for large aneurysm arising from the right vertebral artery or possibility of meningioma. Plavix was added to her regimen and patient has been admitted for further evaluation and neurological consultation.      Past Medical  History:  History reviewed. No pertinent past medical history.  Past Surgical History:  History reviewed. No pertinent surgical history.   Home Meds:  Medications Prior to Admission   Medication Sig Dispense Refill Last Dose   • amLODIPine-atorvastatin (CADUET) 10-10 MG per tablet 10 mg      • clopidogrel (Plavix) 75 MG tablet 75 mg      • gabapentin (NEURONTIN) 100 MG capsule Take 100 mg by mouth 3 (Three) Times a Day.      • levETIRAcetam (KEPPRA) 250 MG tablet 250 mg      • Metoprolol Succinate 50 MG capsule extended-release 24 hour sprinkle 50 mg      • POTASSIUM CHLORIDE ER PO 10 mEq      • rosuvastatin (CRESTOR) 5 MG tablet 5 mg      • Aspirin Buf,CaCarb-MgCarb-MgO, 81 MG tablet Take 81 mg by mouth Daily.      • multivitamin (THERAGRAN) tablet tablet Take  by mouth.        Current Meds:     Current Facility-Administered Medications:   •  [COMPLETED] Insert peripheral IV, , , Once **AND** sodium chloride 0.9 % flush 10 mL, 10 mL, Intravenous, PRN, Parveen Hirsch MD  Allergies:  Not on File  Social History:   Social History     Tobacco Use   • Smoking status: Not on file   • Smokeless tobacco: Not on file   Substance Use Topics   • Alcohol use: Not on file      Family History:  History reviewed. No pertinent family history.       Review of Systems  See history of present illness and past medical history.   Constitutional: Negative for fever.   HENT: Negative.  Negative for sore throat.    Eyes: Negative.    Respiratory: Negative.  Negative for cough.    Cardiovascular: Negative.  Negative for chest pain.   Gastrointestinal: Negative.    Genitourinary: Negative.  Negative for dysuria.   Musculoskeletal: Negative.  Negative for back pain.   Skin: Negative.  Negative for rash.   Neurological: Positive for weakness (As per HPI). Negative for headaches.   All other systems reviewed and are negative.  Remainder of ROS is negative.      Vitals:   /74   Pulse 85   Temp 96.9 °F (36.1 °C)   Resp 18   Ht 160 cm  "(63\")   Wt 70.8 kg (156 lb)   SpO2 96%   BMI 27.63 kg/m²   I/O: No intake or output data in the 24 hours ending 10/15/21 2009  Exam:  Patient is examined using the personal protective equipment as per guidelines from infection control for this particular patient as enacted.  Hand washing was performed before and after patient interaction.  General Appearance:    Alert, cooperative, no distress, appears stated age   Head:    Normocephalic, without obvious abnormality, atraumatic   Eyes:    PERRL, conjunctiva/corneas clear, EOM's intact, both eyes   Ears:    Normal external ear canals, both ears   Nose:   Nares normal, septum midline, mucosa normal, no drainage    or sinus tenderness   Throat:   Lips, tongue, gums normal; oral mucosa pink and moist   Neck:   Supple, symmetrical, trachea midline, no adenopathy;     thyroid:  no enlargement/tenderness/nodules; no carotid    bruit or JVD   Back:     Symmetric, no curvature, ROM normal, no CVA tenderness   Lungs:     Clear to auscultation bilaterally, respirations unlabored   Chest Wall:    No tenderness or deformity    Heart:    Regular rate and rhythm, S1 and S2 normal, no murmur, rub   or gallop   Abdomen:    Soft nontender   Extremities:   Extremities normal, atraumatic, no cyanosis or edema   Pulses:   Pulses palpable in all extremities; symmetric all extremities   Skin:   Skin color normal, Skin is warm and dry,  no rashes or palpable lesions   Neurologic: . Elevated speech and the result of residual from previous stroke, 3/5 strength in upper and lower extremities with decreased  strength. Patient is alert and oriented x3       Data Review:      I reviewed the patient's new clinical results.  Results from last 7 days   Lab Units 10/15/21  1402   WBC 10*3/mm3 8.29   HEMOGLOBIN g/dL 12.8   PLATELETS 10*3/mm3 352     Results from last 7 days   Lab Units 10/15/21  1402   SODIUM mmol/L 138   POTASSIUM mmol/L 4.0   CHLORIDE mmol/L 101   CO2 mmol/L 25.0   BUN mg/dL " 13   CREATININE mg/dL 0.92   CALCIUM mg/dL 9.5   GLUCOSE mg/dL 111*     CT Head Without Contrast    Result Date: 10/15/2021   There is an age-indeterminate lacune within the left lentiform nucleus. Otherwise, there is no convincing evidence to suggest an acute intracranial abnormality.  There are findings compatible with a chronic infarct within the medial portion of the left frontal lobe within the left BIJAN distribution and measures up to approximately 6.0 x 5.6 x 3.0 cm in greatest dimensions. Old lacunar disease is seen within the left caudate extending into the adjacent corona radiata.  There is a partially calcified mass within the inferior aspect of the right middle cranial fossa that is likely extra-axial in nature and likely representative of a partially calcified meningioma. Additionally, within the posterior fossa just anterior to the inferior aspect of the right cerebellar hemisphere along the right lateral aspect of the pontomedullary junction, there is an extra-axial peripherally calcified lesion that measures up to 1.7 x 1.0 cm in greatest axial dimensions. This lesion is within the immediate vicinity of the right vertebral artery and is felt to probably represent a large aneurysm arising from the right vertebral artery, although this could potentially represent a meningioma. Further evaluation could be obtained with either an MRA/MRA study or a CT angiogram.  Incidental note is made of a large sebaceous cyst within the left occipital scalp.    Radiation dose reduction techniques were utilized, including automated exposure control and exposure modulation based on body size.  This report was finalized on 10/15/2021 4:27 PM by Dr. Alfredo Adame M.D.      ECG 12 Lead   Final Result   HEART RATE= 86  bpm   RR Interval= 692  ms   VT Interval= 175  ms   P Horizontal Axis= 7  deg   P Front Axis= 49  deg   QRSD Interval= 95  ms   QT Interval= 389  ms   QRS Axis= -3  deg   T Wave Axis= 46  deg   - NORMAL ECG -    Sinus rhythm   NO PRIOR TRACING AVAILABLE FOR COMPARISON   Electronically Signed By: Barrington MariscalGALINA) 15-Oct-2021 16:08:44   Date and Time of Study: 2021-10-15 14:19:05        Microbiology Results (last 10 days)     Procedure Component Value - Date/Time    COVID PRE-OP / PRE-PROCEDURE SCREENING ORDER (NO ISOLATION) - Swab, Nasopharynx [[id number]]  (Normal) Collected: 10/15/21 1402    Lab Status: Final result Specimen: Swab from Nasopharynx Updated: 10/15/21 1514    Narrative:      The following orders were created for panel order COVID PRE-OP / PRE-PROCEDURE SCREENING ORDER (NO ISOLATION) - Swab, Nasopharynx.  Procedure                               Abnormality         Status                     ---------                               -----------         ------                     COVID-19, [facility] IN-HOUSE...[[id number]]  Normal              Final result                 Please view results for these tests on the individual orders.    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 See stroke order set.  Hypertension-continue  antihypertensive regimen and avoid hypotensive episodes.  History of intracranial aneurysm with abnormal CT scan of the head-neurology consultation get MRI/MRA head and neck and let neurology decide if she needs specific vascular studies. Defer to neurology service neurosurgery service or interventional vascular surgery service consultation is needed.    Garry Childers MD   10/15/2021  20:09 EDT  Much of this encounter note is an electronic transcription/translation of spoken language to printed text. The electronic translation of spoken language may permit erroneous, or at times, nonsensical words or phrases to be inadvertently transcribed; Although I have reviewed the note for such errors, some may still exist

## 2021-10-16 NOTE — THERAPY EVALUATION
Patient Name: Dionne Chavez  : 1931    MRN: [id number]                              Today's Date: 10/16/2021       Admit Date: 10/15/2021    Visit Dx:     ICD-10-CM ICD-9-CM   1. 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)   • History of CVA (cerebrovascular accident)   • HTN (hypertension)     Past Medical History:   Diagnosis Date   • Anemia    • Aneurysm (HCC)     brain aneurysm, unruptured   • Arthritis    • Back pain    • Elevated cholesterol    • Hypertension    • Intestinal gangrene (HCC)    • Loose stools    • Right sided weakness    • Stroke (HCC)      Past Surgical History:   Procedure Laterality Date   • APPENDECTOMY     • CHOLECYSTECTOMY     • HIP SURGERY Right    • HYSTERECTOMY        General Information     Row Name 10/16/21 1313          Physical Therapy Time and Intention    Document Type evaluation  -CB     Mode of Treatment individual therapy; physical therapy  -CB     Row Name 10/16/21 1313          General Information    Patient Profile Reviewed yes  -CB     Prior Level of Function --  Pt reports ind with use of walker and daughter checks in on her daily  -CB     Existing Precautions/Restrictions fall  AFO RLE at baseline  -CB     Barriers to Rehab medically complex  -CB     Row Name 10/16/21 1313          Living Environment    Lives With alone  Pt lives in NH for 2 years and has been living at home alone for 1 wk  -CB     Row Name 10/16/21 1313          Home Main Entrance    Number of Stairs, Main Entrance --  ramp to enter home  -CB     Row Name 10/16/21 1313          Cognition    Orientation Status (Cognition) oriented x 3  -CB     Row Name 10/16/21 1313          Safety Issues, Functional Mobility    Safety Issues Affecting Function (Mobility) positioning of assistive device; judgment;  sequencing abilities  -CB     Impairments Affecting Function (Mobility) balance; coordination; endurance/activity tolerance; grasp; motor control; motor planning; strength; range of motion (ROM); postural/trunk control  -CB     Comment, Safety Issues/Impairments (Mobility) gait belt and non skid socks  -CB           User Key  (r) = Recorded By, (t) = Taken By, (c) = Cosigned By    Initials Name Provider Type    Carlee Burrell Physical Therapist               Mobility     Row Name 10/16/21 1316          Bed Mobility    Bed Mobility supine-sit  -CB     Supine-Sit [place] (Bed Mobility) moderate assist (50% patient effort); 2 person assist; verbal cues  -CB     Assistive Device (Bed Mobility) head of bed elevated  -CB     Row Name 10/16/21 1316          Transfers    Comment (Transfers) R AFO donned prior to tx  -CB     Row Name 10/16/21 1316          Bed-Chair Transfer    Bed-Chair [place] (Transfers) moderate assist (50% patient effort); 2 person assist; verbal cues; nonverbal cues (demo/gesture)  -CB     Assistive Device (Bed-Chair Transfers) walker, front-wheeled  -CB     Row Name 10/16/21 1316          Sit-Stand Transfer    Sit-Stand [place] (Transfers) minimum assist (75% patient effort); moderate assist (50% patient effort); 2 person assist; verbal cues  -CB     Assistive Device (Sit-Stand Transfers) walker, front-wheeled  -CB     Row Name 10/16/21 1316          Gait/Stairs (Locomotion)    [place] Level (Gait) not tested  -CB           User Key  (r) = Recorded By, (t) = Taken By, (c) = Cosigned By    Initials Name Provider Type    Carlee Burrell Physical Therapist               Obj/Interventions     Row Name 10/16/21 1317          Range of Motion Comprehensive    General Range of Motion lower extremity range of motion deficits identified  -CB     Comment, General Range of Motion no DF RLE, PROM WFL except R ankle; LLE WFL  -CB     Row Name 10/16/21 1317          Strength Comprehensive  (MMT)    Comment, General Manual Muscle Testing (MMT) Assessment decreased R  strength, pt unable to perform SLR on RLE; R DF 0/5; R hip flexion 2/5  -CB     Row Name 10/16/21 1317          Motor Skills    Motor Skills coordination  -CB     Coordination finger to nose; right; moderate impairment; heel to shin; severe impairment  -CB     Therapeutic Exercise --   strength R hand with washcloth x10 reps, sh flexion, elbow flex/ext, L LE DF/PF, SLR (assisted on RLE) x10 reps  -CB     Row Name 10/16/21 1317          Balance    Balance Assessment sitting static balance; sitting dynamic balance; standing static balance; standing dynamic balance  -CB     Static Sitting Balance WFL; unsupported; sitting, edge of bed  -CB     Dynamic Sitting Balance mild impairment; unsupported; sitting, edge of bed  -CB     Static Standing Balance moderate impairment; standing; supported  -CB     Dynamic Standing Balance moderate impairment; supported; standing  -CB     Row Name 10/16/21 1317          Sensory Assessment (Somatosensory)    Sensory Assessment (Somatosensory) LE sensation intact  -CB           User Key  (r) = Recorded By, (t) = Taken By, (c) = Cosigned By    Initials Name Provider Type    CB Carlee Medina Physical Therapist               Goals/Plan     Row Name 10/16/21 6845          Bed Mobility Goal 1 (PT)    Activity/Assistive Device (Bed Mobility Goal 1, PT) bed mobility activities, all  -CB     [place] Level/Cues Needed (Bed Mobility Goal 1, PT) minimum assist (75% or more patient effort)  -CB     Time Frame (Bed Mobility Goal 1, PT) long term goal (LTG); 1 week  -CB     Row Name 10/16/21 1091          Transfer Goal 1 (PT)    Activity/Assistive Device (Transfer Goal 1, PT) sit-to-stand/stand-to-sit; bed-to-chair/chair-to-bed  -CB     [place] Level/Cues Needed (Transfer Goal 1, PT) moderate assist (50-74% patient effort)  -CB     Time Frame (Transfer Goal 1, PT) long term goal (LTG); 1 week  -CB     Row  Name 10/16/21 1326          Gait Training Goal 1 (PT)    [place] Level (Gait Training Goal 1, PT) moderate assist (50-74% patient effort)  -CB     Distance (Gait Training Goal 1, PT) 10ft  -CB     Time Frame (Gait Training Goal 1, PT) long term goal (LTG); 1 week  -CB           User Key  (r) = Recorded By, (t) = Taken By, (c) = Cosigned By    Initials Name Provider Type    CB Carlee Medina Physical Therapist               Clinical Impression     Row Name 10/16/21 1324          Pain    Additional Documentation Pain Scale: Numbers Pre/Post-Treatment (Group)  -CB     Row Name 10/16/21 1329          Pain Scale: Numbers Pre/Post-Treatment    Pretreatment Pain Rating 0/10 - no pain  -CB     Posttreatment Pain Rating 0/10 - no pain  -CB     Row Name 10/16/21 1329          Plan of Care Review    Plan of Care Reviewed With patient  -CB     Progress improving  -CB     Outcome Summary Patient is a 89 yo female who presentedw tih R sided weakness UE/LE. Workup for acute CVA. MRI not completed at this time. Pt lived in NH for the last 2 years but one week ago moved back into her home alone. Pt reports ramp to enter home and was ind with use fo walker at baseline. She has AFO for RLE. Pt presents today with decreased RLE/UE strength, ROM and UE/LE coordination. Pt required modAx2 for supine to sitting and bed to chair transfer using rwx. Pt completed STS to rwx with min-modAx2. Pt unable to advance RLE/LLE during transfer. Cues for safety and assist to move rwx during tx. Pt educated regarding UE/LE and  strength exercises.  Pt will benefit from skilled PT to address functional deficits. Anticipate SNF at discharge.  -CB     Row Name 10/16/21 1322          Therapy Assessment/Plan (PT)    Patient/Family Therapy Goals Statement (PT) return to home  -CB     Rehab Potential (PT) good, to achieve stated therapy goals  -CB     Criteria for Skilled Interventions Met (PT) yes  -CB     Row Name 10/16/21 5185          Positioning  and Restraints    Pre-Treatment Position in bed  -CB     Post Treatment Position chair  -CB     In Chair notified nsg; reclined; call light within reach; encouraged to call for assist; exit alarm on  -CB           User Key  (r) = Recorded By, (t) = Taken By, (c) = Cosigned By    Initials Name Provider Type    Carlee Burrell Physical Therapist               Outcome Measures     Row Name 10/16/21 1313          How much help from another person do you currently need...    Turning from your back to your side while in flat bed without using bedrails? 2  -CB     Moving from lying on back to sitting on the side of a flat bed without bedrails? 2  -CB     Moving to and from a bed to a chair (including a wheelchair)? 2  -CB     Standing up from a chair using your arms (e.g., wheelchair, bedside chair)? 2  -CB     Climbing 3-5 steps with a railing? 1  -CB     To walk in hospital room? 1  -CB     AM-PAC 6 Clicks Score (PT) 10  -CB     Row Name 10/16/21 1313          Modified Clark Scale    Modified Nehemiah Scale 4 - Moderately severe disability.  Unable to walk without assistance, and unable to attend to own bodily needs without assistance.  -CB     Row Name 10/16/21 1313          Functional Assessment    Outcome Measure Options AM-PAC 6 Clicks Basic Mobility (PT); Modified Clark  -CB           User Key  (r) = Recorded By, (t) = Taken By, (c) = Cosigned By    Initials Name Provider Type    Carlee Burrell Physical Therapist                             Physical Therapy Education                 Title: PT OT SLP Therapies (Done)     Topic: Physical Therapy (Done)     Point: Mobility training (Done)     Learning Progress Summary           Patient Acceptance, E,TB,D, VU,NR by KEITH at 10/16/2021 1328                   Point: Home exercise program (Done)     Learning Progress Summary           Patient Acceptance, E,TB,D, VU,NR by  at 10/16/2021 1328                   Point: Body mechanics (Done)     Learning Progress Summary            Patient Acceptance, E,TB,D, VU,NR by CB at 10/16/2021 1328                   Point: Precautions (Done)     Learning Progress Summary           Patient Acceptance, E,TB,D, VU,NR by  at 10/16/2021 1328                               User Key     Initials Effective Dates Name Provider Type Discipline     12/30/20 -  Carlee Medina Physical Therapist PT              PT Recommendation and Plan  Planned Therapy Interventions (PT): balance training, bed mobility training, gait training, home exercise program, patient/family education, neuromuscular re-education, strengthening, transfer training  Plan of Care Reviewed With: patient  Progress: improving  Outcome Summary: Patient is a 89 yo female who presentedw tih R sided weakness UE/LE. Workup for acute CVA. MRI not completed at this time. Pt lived in NH for the last 2 years but one week ago moved back into her home alone. Pt reports ramp to enter home and was ind with use fo walker at baseline. She has AFO for RLE. Pt presents today with decreased RLE/UE strength, ROM and UE/LE coordination. Pt required modAx2 for supine to sitting and bed to chair transfer using rwx. Pt completed STS to rwx with min-modAx2. Pt unable to advance RLE/LLE during transfer. Cues for safety and assist to move rwx during tx. Pt educated regarding UE/LE and  strength exercises.  Pt will benefit from skilled PT to address functional deficits. Anticipate SNF at discharge.     Time Calculation:    PT Charges     Row Name 10/16/21 1329             Time Calculation    Start Time 1135  -CB      Stop Time 1149  -CB      Time Calculation (min) 14 min  -CB      PT Received On 10/16/21  -CB      PT - Next Appointment 10/17/21  -CB      PT Goal Re-Cert Due Date 10/23/21  -CB              Time Calculation- PT    Total Timed Code Minutes- PT 9 minute(s)  -CB              Timed Charges    20588 - PT Therapeutic Activity Minutes 9  -CB              Total Minutes    Timed Charges Total Minutes 9   -CB       Total Minutes 9  -CB            User Key  (r) = Recorded By, (t) = Taken By, (c) = Cosigned By    Initials Name Provider Type    CB Carlee Medina Physical Therapist              Therapy Charges for Today     Code Description Service Date Service Provider Modifiers Qty    45888411440  PT THERAPEUTIC ACT EA 15 MIN 10/16/2021 Carlee Medina GP 1    34121942798 HC PT EVAL MOD COMPLEXITY 2 10/16/2021 Carlee Medina GP 1    77607604729 HC PT THER SUPP EA 15 MIN 10/16/2021 Carlee Medina GP 1          PT G-Codes  Outcome Measure Options: AM-PAC 6 Clicks Basic Mobility (PT), Modified [place]  AM-PAC 6 Clicks Score (PT): 10  Modified [place] Scale: 4 - Moderately severe disability.  Unable to walk without assistance, and unable to attend to own bodily needs without assistance.    Carlee Medina  10/16/2021

## 2021-10-16 NOTE — PLAN OF CARE
Problem: Adult Inpatient Plan of Care  Goal: Plan of Care Review  Outcome: Ongoing, Progressing  Flowsheets (Taken 10/16/2021 1824)  Progress: improving  Plan of Care Reviewed With:   patient   daughter  Outcome Summary: VSS, no c/o pain, ambulated well with staff, up to chair and BSC, went down for Echo, last NIH 3, neurosurgery consulted, plan to DC home/rehab when medically ready  Goal: Patient-Specific Goal (Individualized)  Outcome: Ongoing, Progressing  Goal: Absence of Hospital-Acquired Illness or Injury  Outcome: Ongoing, Progressing  Intervention: Identify and Manage Fall Risk  Recent Flowsheet Documentation  Taken 10/16/2021 1823 by Soni Sun RN  Safety Promotion/Fall Prevention:   safety round/check completed   room organization consistent  Taken 10/16/2021 1600 by Soni Sun RN  Safety Promotion/Fall Prevention:   safety round/check completed   room organization consistent  Taken 10/16/2021 1200 by Soni Sun RN  Safety Promotion/Fall Prevention:   activity supervised   safety round/check completed   room organization consistent  Taken 10/16/2021 1000 by Soni Sun RN  Safety Promotion/Fall Prevention: patient off unit  Taken 10/16/2021 0850 by Soni Sun RN  Safety Promotion/Fall Prevention:   activity supervised   room organization consistent   safety round/check completed  Intervention: Prevent Skin Injury  Recent Flowsheet Documentation  Taken 10/16/2021 1823 by Soni Sun RN  Body Position:   position changed independently   weight shift assistance provided  Taken 10/16/2021 1200 by Soni Sun RN  Body Position:   weight shift assistance provided   position changed independently  Taken 10/16/2021 0850 by Soni Sun RN  Body Position: supine  Skin Protection:   adhesive use limited   tubing/devices free from skin contact  Intervention: Prevent and Manage VTE (venous thromboembolism) Risk  Recent Flowsheet Documentation  Taken  10/16/2021 0850 by Soni Sun RN  VTE Prevention/Management:   dorsiflexion/plantar flexion performed   patient refused intervention   sequential compression devices off  Intervention: Prevent Infection  Recent Flowsheet Documentation  Taken 10/16/2021 1823 by Soni Sun RN  Infection Prevention: single patient room provided  Taken 10/16/2021 1600 by Soni Sun RN  Infection Prevention: single patient room provided  Taken 10/16/2021 1200 by Soni Sun RN  Infection Prevention: single patient room provided  Taken 10/16/2021 0850 by Soni Sun RN  Infection Prevention:   single patient room provided   rest/sleep promoted  Goal: Optimal Comfort and Wellbeing  Outcome: Ongoing, Progressing  Intervention: Provide Person-Centered Care  Recent Flowsheet Documentation  Taken 10/16/2021 1823 by Soni Sun RN  Trust Relationship/Rapport:   care explained   questions answered  Taken 10/16/2021 1600 by Soni Sun RN  Trust Relationship/Rapport: care explained  Taken 10/16/2021 1445 by Soni Sun RN  Trust Relationship/Rapport:   care explained   questions answered  Taken 10/16/2021 1200 by Soni Sun RN  Trust Relationship/Rapport:   care explained   questions answered  Taken 10/16/2021 0850 by Soni Sun RN  Trust Relationship/Rapport:   care explained   questions answered  Goal: Readiness for Transition of Care  Outcome: Ongoing, Progressing     Problem: Fall Injury Risk  Goal: Absence of Fall and Fall-Related Injury  Outcome: Ongoing, Progressing  Intervention: Identify and Manage Contributors to Fall Injury Risk  Recent Flowsheet Documentation  Taken 10/16/2021 0850 by Soni Sun RN  Medication Review/Management: medications reviewed  Intervention: Promote Injury-Free Environment  Recent Flowsheet Documentation  Taken 10/16/2021 1823 by Soni Sun RN  Safety Promotion/Fall Prevention:   safety round/check completed   room  organization consistent  Taken 10/16/2021 1600 by Soni Sun RN  Safety Promotion/Fall Prevention:   safety round/check completed   room organization consistent  Taken 10/16/2021 1200 by Soni Sun RN  Safety Promotion/Fall Prevention:   activity supervised   safety round/check completed   room organization consistent  Taken 10/16/2021 1000 by Soni Sun RN  Safety Promotion/Fall Prevention: patient off unit  Taken 10/16/2021 0850 by Soni Sun RN  Safety Promotion/Fall Prevention:   activity supervised   room organization consistent   safety round/check completed     Problem: Skin Injury Risk Increased  Goal: Skin Health and Integrity  Outcome: Ongoing, Progressing  Intervention: Optimize Skin Protection  Recent Flowsheet Documentation  Taken 10/16/2021 0850 by Soni Sun RN  Pressure Reduction Techniques:   weight shift assistance provided   frequent weight shift encouraged  Head of Bed (HOB): HOB at 20 degrees  Pressure Reduction Devices: alternating pressure pump (ADD)  Skin Protection:   adhesive use limited   tubing/devices free from skin contact   Goal Outcome Evaluation:  Plan of Care Reviewed With: patient, daughter        Progress: improving  Outcome Summary: VSS, no c/o pain, ambulated well with staff, up to chair and BSC, went down for Echo, last NIH 3, neurosurgery consulted, plan to DC home/rehab when medically ready

## 2021-10-16 NOTE — PROGRESS NOTES
[facility] Acute Inpt Rehab Note     Referral received via stroke order set.  Please note this is a screening only, rehab admissions will not actively be evaluating this patient.  If felt patient is appropriate for our services once therapies begin, please call our office at [phone], to initiate a full referral.    Thank you,   Judy Navas RN   Rehab Admission Nurse

## 2021-10-16 NOTE — CONSULTS
DOS: 10/16/2021  NAME: Dionne Chavez   : 1931  PCP: Provider, No Known  CC: Stroke  Referring MD: Garry Childers MD    Neurological Problem and Interval History:  89 y.o. right-handed white female with a Hx of prior strokes about 7 years ago and then had another 1 last Wednesday.  However she did not report to the hospital right away.  She currently has fixed deficits with right arm being weak and she has a drift when she tries to pick it up but the right leg is completely paralyzed and she is not able to lift it off the bed.  She is also not able to move the right leg side to side.  Her speech is clear and her mentation is normal and her vision is normal and there is no facial asymmetry.  The current NIH stroke scale is 5.  Past Medical/Surgical Hx:  Past Medical History:   Diagnosis Date   • Anemia    • Aneurysm (HCC)     brain aneurysm, unruptured   • Arthritis    • Back pain    • Elevated cholesterol    • Hypertension    • Intestinal gangrene (HCC)    • Loose stools    • Right sided weakness    • Stroke (HCC)      Past Surgical History:   Procedure Laterality Date   • APPENDECTOMY     • CHOLECYSTECTOMY     • HIP SURGERY Right    • HYSTERECTOMY         Review of Systems:    Constitutional: Pleasant lady currently laying in bed getting ready for tests  Cardiovascular: Denies any chest pain or palpitations  Respiratory: Denies any shortness of breath  Gastrointestinal: Denies any nausea and vomiting  Genitourinary: Currently using dependable's  Musculoskeletal: Right leg completely paralyzed  Dermatological: No skin breakdown noted  Neurological: Has right-sided weakness as discussed above  Psychiatric: Denies any underlying anxiety or depression  Ophthalmological: Denies any vision changes.          Medications On Admission  Medications Prior to Admission   Medication Sig Dispense Refill Last Dose   • amLODIPine-atorvastatin (CADUET) 10-10 MG per tablet Take 1 tablet by mouth Daily.   10/15/2021 at Unknown  time   • Aspirin Buf,CaCarb-MgCarb-MgO, 81 MG tablet Take 81 mg by mouth Daily.   10/15/2021 at Unknown time   • clopidogrel (Plavix) 75 MG tablet Take 75 mg by mouth Daily.   10/15/2021 at Unknown time   • gabapentin (NEURONTIN) 100 MG capsule Take 200 mg by mouth 3 (Three) Times a Day.   10/15/2021 at Unknown time   • levETIRAcetam (KEPPRA) 250 MG tablet Take 250 mg by mouth 2 (Two) Times a Day.   10/15/2021 at Unknown time   • Metoprolol Succinate 50 MG capsule extended-release 24 hour sprinkle Take 50 mg by mouth.   10/15/2021 at Unknown time   • multivitamin (THERAGRAN) tablet tablet Take 1 tablet by mouth Daily.   10/15/2021 at Unknown time   • POTASSIUM CHLORIDE ER PO Take 20 mEq by mouth Daily.   10/15/2021 at Unknown time   • rosuvastatin (CRESTOR) 5 MG tablet Take 5 mg by mouth Every Night.      • vitamin B-12 (CYANOCOBALAMIN) 1000 MCG tablet Take 1,000 mcg by mouth Daily.   10/15/2021 at Unknown time   • vitamin D3 125 MCG (5000 UT) capsule capsule Take 2,000 Units by mouth Daily.   10/15/2021 at Unknown time       Allergies:  Allergies   Allergen Reactions   • Ether Other (See Comments)     Pass out       Social Hx:  Social History     Socioeconomic History   • Marital status:    Tobacco Use   • Smoking status: Never Smoker   • Smokeless tobacco: Never Used   Substance and Sexual Activity   • Alcohol use: Not Currently   • Drug use: Never   • Sexual activity: Not Currently       Family Hx:  History reviewed. No pertinent family history.    Review of Imaging (Interpretation of images not reports): She had urgent CT of the brain performed which shows the following:  IMPRESSION:     There is an age-indeterminate lacune within the left lentiform nucleus.  Otherwise, there is no convincing evidence to suggest an acute  intracranial abnormality.     There are findings compatible with a chronic infarct within the medial  portion of the left frontal lobe within the left BIJAN distribution and  measures up  to approximately 6.0 x 5.6 x 3.0 cm in greatest dimensions.  Old lacunar disease is seen within the left caudate extending into the  adjacent corona radiata.     There is a partially calcified mass within the inferior aspect of the  right middle cranial fossa that is likely extra-axial in nature and  likely representative of a partially calcified meningioma. Additionally,  within the posterior fossa just anterior to the inferior aspect of the  right cerebellar hemisphere along the right lateral aspect of the  pontomedullary junction, there is an extra-axial peripherally calcified  lesion that measures up to 1.7 x 1.0 cm in greatest axial dimensions.  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 Further evaluation could be obtained with either an MRA/MRA  study or a CT angiogram.      Additional Tests Performed: Carotid Doppler studies and echocardiogram currently pending      Laboratory Results:   Lab Results   Component Value Date    GLUCOSE 111 (H) 10/15/2021    CALCIUM 9.5 10/15/2021     10/15/2021    K 4.0 10/15/2021    CO2 25.0 10/15/2021     10/15/2021    BUN 13 10/15/2021    CREATININE 0.92 10/15/2021    EGFRIFNONA 57 (L) 10/15/2021    BCR 14.1 10/15/2021    ANIONGAP 12.0 10/15/2021     Lab Results   Component Value Date    WBC 7.03 10/16/2021    HGB 12.3 10/16/2021    HCT 38.4 10/16/2021    MCV 92.3 10/16/2021     10/16/2021     No results found for: CHOL  Lab Results   Component Value Date    HDL 56 05/01/2019    HDL 56 06/23/2018    HDL 41 (L) 02/19/2018     Lab Results   Component Value Date    LDL 49 05/01/2019    LDL 52 06/23/2018    LDL 58 02/19/2018     Lab Results   Component Value Date    TRIG 57 05/01/2019    TRIG 75 06/23/2018    TRIG 92 02/19/2018     Lab Results   Component Value Date    HGBA1C 5.10  "10/16/2021     Lab Results   Component Value Date    INR 0.92 10/15/2021    INR 0.9 12/12/2019    INR 1.0 06/21/2018    PROTIME 12.2 10/15/2021    PROTIME 10.6 12/12/2019    PROTIME 11.0 06/21/2018         Physical Examination:  /60 (BP Location: Left arm, Patient Position: Lying)   Pulse 83   Temp 97.9 °F (36.6 °C) (Oral)   Resp 18   Ht 160 cm (63\")   Wt 70.8 kg (156 lb)   SpO2 97%   BMI 27.63 kg/m²   General Appearance:   Well developed, well nourished, well groomed, alert, and cooperative.  HEENT: Normocephalic.  Normal fundoscopic exam including normal retina, discs are flat with sharp margins, normal vasculature.  Neck and Spine: Normal range of motion.  Normal alignment. No mass or tenderness. No bruits.  Cardiac: Regular rate and rhythm. No murmurs.  Peripheral Vasculature: Radial and pedal pulses are equal and symmetric. No signs of distal embolization.  Extremities:    No edema or deformities. Normal joint ROM. XENA hoses and SCD's in place  Skin:    No rashes or birth marks.    Neurological examination:  Higher Integrative  Function: Oriented to time, place and person. Normal registration, recall, attention span and concentration. Normal language including comprehension, spontaneous speech, repetition, reading, writing, naming and vocabulary. No neglect with normal visual-spatial function and construction. Normal fund of knowledge and higher integrative function.  CN II: Pupils are equal, round, and reactive to light. Normal visual acuity and visual fields.    CN III IV VI: Extraocular movements are full without nystagmus.   CN V: Normal facial sensation and strength of muscles of mastication.  CN VII: Facial movements are symmetric. No weakness.  CN VIII:   Auditory acuity is normal.  CN IX & X:   Symmetric palatal movement.  CN XI: Sternocleidomastoid and trapezius are normal.  No weakness.  CN XII:   The tongue is midline.  No atrophy or fasciculations.  Motor: She is completely paralyzed in " the right leg.  She has drift noticeable in the right upper extremity.  The left side is completely normal..  Reflexes: Unable to perform any deep tendon reflexes.  Sensation: Normal to light touch, pinprick, vibration, temperature, and proprioception in arms and legs. Normal graphesthesia and no extinction on DSS.  Station and Gait: Able to test gait or Romberg at this time.    Coordination: Normal coordination on the left side the right side was difficult to test because of weakness.    NIHSS:     Baseline  0-->Alert: keenly responsive  0-->Answers both questions correctly    0=normal  0=No visual loss  0=Normal symmetric movement  0-->No drift: limb holds 90 (or 45) degrees for full 10 secs  1-->Drift: limb holds 90 (or 45) degrees, but drifts down before full 10 seconds: does not hit bed or other support  0-->No drift: limb holds 90 (or 45) degrees for full 10 secs  4-->No movement  0=Absent  0=Normal; no sensory loss  0=No aphasia, normal  0=Normal  0=No abnormality    Total score: 5    Diagnoses / Discussion:  89 y.o. who presents with Sx of acute cerebrovascular event involving the left anterior cerebral artery.  Denies stroke scale is 5.    Plan:  Aspirin 325 mg  Hydrate  Neuro checks  Check lipid panel, Hgb A1C  Lipitor 80 mg  Non-pharmacological DVT prophylaxis  TTE  Carotid Doppler studies  MRI of the brain with stroke protocol  EKG Tele  PT/OT/ST  Stroke Education  Blood pressure control to <130/80  Goal LDL <70-recommend high dose statins-   Serum glucose < 140     Discussed signs and symptoms of stroke and when to call 911. Instructed to follow a low fat diet including the Mediterranean diet. Instructed to take all medications daily as prescribed. Encouraged 30 minutes of exercise 3-4 times a week as tolerated. Stay well hydrated. Discussed potential side effects of new medications and signs and symptoms to report.  The patient is participating with physical therapy and Occupational Therapy she might  be a good candidate for inpatient rehabilitation.  Reviewed stroke risk factors and stroke prevention plan. Patient verbalizes understanding and agrees with plan.     I have discussed the above with the patient and the nursing staff and she will be followed up by our inpatient neurology team.  Time spent with patient: 60min    Coding    Dictated using Dragon dictation.

## 2021-10-17 ENCOUNTER — APPOINTMENT (OUTPATIENT)
Dept: CARDIOLOGY | Facility: HOSPITAL | Age: 86
End: 2021-10-17

## 2021-10-17 ENCOUNTER — APPOINTMENT (OUTPATIENT)
Dept: MRI IMAGING | Facility: HOSPITAL | Age: 86
End: 2021-10-17

## 2021-10-17 LAB
ALBUMIN SERPL-MCNC: 3.9 G/DL (ref 3.5–5.2)
ALBUMIN/GLOB SERPL: 1.6 G/DL
ALP SERPL-CCNC: 60 U/L (ref 39–117)
ALT SERPL W P-5'-P-CCNC: 11 U/L (ref 1–33)
ANION GAP SERPL CALCULATED.3IONS-SCNC: 10.4 MMOL/L (ref 5–15)
AST SERPL-CCNC: 14 U/L (ref 1–32)
BASOPHILS # BLD AUTO: 0.04 10*3/MM3 (ref 0–0.2)
BASOPHILS NFR BLD AUTO: 0.5 % (ref 0–1.5)
BH CV XLRA MEAS LEFT CCA RATIO VEL: -46.9 CM/SEC
BH CV XLRA MEAS LEFT DIST CCA EDV: -8.4 CM/SEC
BH CV XLRA MEAS LEFT DIST CCA PSV: -46.9 CM/SEC
BH CV XLRA MEAS LEFT DIST ICA EDV: -14.2 CM/SEC
BH CV XLRA MEAS LEFT DIST ICA PSV: -75.7 CM/SEC
BH CV XLRA MEAS LEFT ICA RATIO VEL: -75.6 CM/SEC
BH CV XLRA MEAS LEFT ICA/CCA RATIO: 1.6
BH CV XLRA MEAS LEFT MID ICA EDV: -17.7 CM/SEC
BH CV XLRA MEAS LEFT MID ICA PSV: -62.4 CM/SEC
BH CV XLRA MEAS LEFT PROX CCA EDV: -8.1 CM/SEC
BH CV XLRA MEAS LEFT PROX CCA PSV: -57.3 CM/SEC
BH CV XLRA MEAS LEFT PROX ECA EDV: -4.6 CM/SEC
BH CV XLRA MEAS LEFT PROX ECA PSV: -50.4 CM/SEC
BH CV XLRA MEAS LEFT PROX ICA EDV: -9.8 CM/SEC
BH CV XLRA MEAS LEFT PROX ICA PSV: -36.3 CM/SEC
BH CV XLRA MEAS LEFT PROX SCLA PSV: 92.7 CM/SEC
BH CV XLRA MEAS LEFT VERTEBRAL A EDV: 5.5 CM/SEC
BH CV XLRA MEAS LEFT VERTEBRAL A PSV: 44.3 CM/SEC
BH CV XLRA MEAS RIGHT CCA RATIO VEL: -41.1 CM/SEC
BH CV XLRA MEAS RIGHT DIST CCA EDV: -7.5 CM/SEC
BH CV XLRA MEAS RIGHT DIST CCA PSV: -41.1 CM/SEC
BH CV XLRA MEAS RIGHT DIST ICA EDV: -9.4 CM/SEC
BH CV XLRA MEAS RIGHT DIST ICA PSV: -48.2 CM/SEC
BH CV XLRA MEAS RIGHT ICA RATIO VEL: -58.8 CM/SEC
BH CV XLRA MEAS RIGHT ICA/CCA RATIO: 1.4
BH CV XLRA MEAS RIGHT MID ICA EDV: -14.5 CM/SEC
BH CV XLRA MEAS RIGHT MID ICA PSV: -58.8 CM/SEC
BH CV XLRA MEAS RIGHT PROX CCA EDV: 11 CM/SEC
BH CV XLRA MEAS RIGHT PROX CCA PSV: 49.4 CM/SEC
BH CV XLRA MEAS RIGHT PROX ECA EDV: 2.5 CM/SEC
BH CV XLRA MEAS RIGHT PROX ECA PSV: 36.7 CM/SEC
BH CV XLRA MEAS RIGHT PROX ICA EDV: -8.3 CM/SEC
BH CV XLRA MEAS RIGHT PROX ICA PSV: -35.7 CM/SEC
BH CV XLRA MEAS RIGHT PROX SCLA EDV: 10.8 CM/SEC
BH CV XLRA MEAS RIGHT PROX SCLA PSV: 55.5 CM/SEC
BH CV XLRA MEAS RIGHT VERTEBRAL A EDV: 10.2 CM/SEC
BH CV XLRA MEAS RIGHT VERTEBRAL A PSV: 54.1 CM/SEC
BILIRUB SERPL-MCNC: 0.3 MG/DL (ref 0–1.2)
BUN SERPL-MCNC: 14 MG/DL (ref 8–23)
BUN/CREAT SERPL: 15.9 (ref 7–25)
CALCIUM SPEC-SCNC: 8.5 MG/DL (ref 8.2–9.6)
CHLORIDE SERPL-SCNC: 105 MMOL/L (ref 98–107)
CO2 SERPL-SCNC: 25.6 MMOL/L (ref 22–29)
CREAT SERPL-MCNC: 0.88 MG/DL (ref 0.57–1)
DEPRECATED RDW RBC AUTO: 43.5 FL (ref 37–54)
EOSINOPHIL # BLD AUTO: 0.16 10*3/MM3 (ref 0–0.4)
EOSINOPHIL NFR BLD AUTO: 2.2 % (ref 0.3–6.2)
ERYTHROCYTE [DISTWIDTH] IN BLOOD BY AUTOMATED COUNT: 12.7 % (ref 12.3–15.4)
GFR SERPL CREATININE-BSD FRML MDRD: 60 ML/MIN/1.73
GLOBULIN UR ELPH-MCNC: 2.4 GM/DL
GLUCOSE SERPL-MCNC: 92 MG/DL (ref 65–99)
HCT VFR BLD AUTO: 39.1 % (ref 34–46.6)
HGB BLD-MCNC: 12.5 G/DL (ref 12–15.9)
IMM GRANULOCYTES # BLD AUTO: 0.02 10*3/MM3 (ref 0–0.05)
IMM GRANULOCYTES NFR BLD AUTO: 0.3 % (ref 0–0.5)
LEFT ARM BP: NORMAL MMHG
LYMPHOCYTES # BLD AUTO: 1.67 10*3/MM3 (ref 0.7–3.1)
LYMPHOCYTES NFR BLD AUTO: 22.6 % (ref 19.6–45.3)
MAGNESIUM SERPL-MCNC: 2.2 MG/DL (ref 1.6–2.4)
MAXIMAL PREDICTED HEART RATE: 130 BPM
MCH RBC QN AUTO: 29.8 PG (ref 26.6–33)
MCHC RBC AUTO-ENTMCNC: 32 G/DL (ref 31.5–35.7)
MCV RBC AUTO: 93.1 FL (ref 79–97)
MONOCYTES # BLD AUTO: 0.54 10*3/MM3 (ref 0.1–0.9)
MONOCYTES NFR BLD AUTO: 7.3 % (ref 5–12)
NEUTROPHILS NFR BLD AUTO: 4.97 10*3/MM3 (ref 1.7–7)
NEUTROPHILS NFR BLD AUTO: 67.1 % (ref 42.7–76)
NRBC BLD AUTO-RTO: 0 /100 WBC (ref 0–0.2)
PLATELET # BLD AUTO: 341 10*3/MM3 (ref 140–450)
PMV BLD AUTO: 9.2 FL (ref 6–12)
POTASSIUM SERPL-SCNC: 4 MMOL/L (ref 3.5–5.2)
PROT SERPL-MCNC: 6.3 G/DL (ref 6–8.5)
RBC # BLD AUTO: 4.2 10*6/MM3 (ref 3.77–5.28)
RIGHT ARM BP: NORMAL MMHG
SODIUM SERPL-SCNC: 141 MMOL/L (ref 136–145)
STRESS TARGET HR: 111 BPM
WBC # BLD AUTO: 7.4 10*3/MM3 (ref 3.4–10.8)

## 2021-10-17 PROCEDURE — 80053 COMPREHEN METABOLIC PANEL: CPT | Performed by: HOSPITALIST

## 2021-10-17 PROCEDURE — 97530 THERAPEUTIC ACTIVITIES: CPT

## 2021-10-17 PROCEDURE — 83735 ASSAY OF MAGNESIUM: CPT | Performed by: HOSPITALIST

## 2021-10-17 PROCEDURE — 99232 SBSQ HOSP IP/OBS MODERATE 35: CPT | Performed by: PSYCHIATRY & NEUROLOGY

## 2021-10-17 PROCEDURE — 93880 EXTRACRANIAL BILAT STUDY: CPT

## 2021-10-17 PROCEDURE — 70551 MRI BRAIN STEM W/O DYE: CPT

## 2021-10-17 PROCEDURE — 85025 COMPLETE CBC W/AUTO DIFF WBC: CPT | Performed by: HOSPITALIST

## 2021-10-17 RX ADMIN — POTASSIUM CHLORIDE 10 MEQ: 750 TABLET, EXTENDED RELEASE ORAL at 09:15

## 2021-10-17 RX ADMIN — CLOPIDOGREL 75 MG: 75 TABLET, FILM COATED ORAL at 09:15

## 2021-10-17 RX ADMIN — GABAPENTIN 200 MG: 100 CAPSULE ORAL at 20:32

## 2021-10-17 RX ADMIN — GABAPENTIN 200 MG: 100 CAPSULE ORAL at 14:59

## 2021-10-17 RX ADMIN — Medication 1 TABLET: at 09:15

## 2021-10-17 RX ADMIN — SODIUM CHLORIDE, PRESERVATIVE FREE 10 ML: 5 INJECTION INTRAVENOUS at 20:31

## 2021-10-17 RX ADMIN — GABAPENTIN 200 MG: 100 CAPSULE ORAL at 09:15

## 2021-10-17 RX ADMIN — SODIUM CHLORIDE, PRESERVATIVE FREE 10 ML: 5 INJECTION INTRAVENOUS at 09:16

## 2021-10-17 RX ADMIN — LEVETIRACETAM 250 MG: 250 TABLET, FILM COATED ORAL at 09:15

## 2021-10-17 RX ADMIN — ATORVASTATIN CALCIUM 10 MG: 20 TABLET, FILM COATED ORAL at 20:31

## 2021-10-17 RX ADMIN — METOPROLOL SUCCINATE 50 MG: 50 TABLET, EXTENDED RELEASE ORAL at 09:15

## 2021-10-17 RX ADMIN — ASPIRIN 81 MG: 81 TABLET, COATED ORAL at 09:15

## 2021-10-17 RX ADMIN — AMLODIPINE BESYLATE 10 MG: 10 TABLET ORAL at 20:32

## 2021-10-17 RX ADMIN — LEVETIRACETAM 250 MG: 250 TABLET, FILM COATED ORAL at 20:32

## 2021-10-17 NOTE — PROGRESS NOTES
"DOS: 10/17/2021  NAME: Dionne Chavez   : 1931  PCP: Provider, No Known  Chief Complaint   Patient presents with   • Extremity Weakness       Chief complaint: Patient is laying comfortably in bed does not want to cooperate much with neuro exam.  Subjective: Patient has fixed deficit in the right leg from prior strokes.  Patient has no facial asymmetry at this time.    Objective:  Vital signs: /55 (BP Location: Right arm, Patient Position: Lying)   Pulse 64   Temp 97.5 °F (36.4 °C) (Oral)   Resp 16   Ht 160 cm (63\")   Wt 70.8 kg (156 lb)   SpO2 99%   BMI 27.63 kg/m²    Gen: NAD, vitals reviewed  MS: Back to baseline.  CN: Pupils are equal reacting to light and accommodation.  Extraocular muscles are intact.  Visual fields are also intact.  Patient is hard of hearing.  Muscles of facial expression mastication uvula soft palate and tongue are all symmetrical with full range of motion.  Corneal reflexes gag reflex is well-maintained.  Shoulder shrug bilaterally well-maintained.  Motor: Patient has fixed deficit in the right lower extremity whereby she uses a brace.  Rest of the motor exam is normal.  Sensory: Normal sensory bilaterally.  DTRs: 2+ bilaterally with downgoing toes.  Coordination: Normal finger-to-nose coordination.  Gait: Patient ambulates with gait belt and walker and also has a fixed deficit in the right lower leg whereby she uses a brace.    ROS:  General: Pleasant lady currently laying in bed in no distress  Neurological: Has fixed deficit in the right lower extremity from before.  She is also aware of the aneurysm she has had in the past and does not want any further testings.    Laboratory results:  Lab Results   Component Value Date    GLUCOSE 92 10/17/2021    CALCIUM 8.5 10/17/2021     10/17/2021    K 4.0 10/17/2021    CO2 25.6 10/17/2021     10/17/2021    BUN 14 10/17/2021    CREATININE 0.88 10/17/2021    EGFRIFNONA 60 (L) 10/17/2021    BCR 15.9 10/17/2021    ANIONGAP " 10.4 10/17/2021     Lab Results   Component Value Date    WBC 7.40 10/17/2021    HGB 12.5 10/17/2021    HCT 39.1 10/17/2021    MCV 93.1 10/17/2021     10/17/2021     Lab Results   Component Value Date    LDL 38 10/16/2021    LDL 49 05/01/2019    LDL 52 06/23/2018         Lab 10/16/21  0720   HEMOGLOBIN A1C 5.10        Review of labs: The hemoglobin A1c is 5.1.    Review and interpretation of imaging: MRI of the brain with stroke protocol shows the following:  IMPRESSION:  1. No evidence of acute infarction.  2. 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  3. 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 This measures  about 14 x 9 mm diameter.  4. Partially calcified extra-axial lesion seen in the right middle  cranial fossa inferolaterally on CT is not well demonstrated on this  noncontrast brain MRI.      Workup to date: Transthoracic echocardiogram performed on 10/16/2021 shows the following:    Interpretation Summary    · Calculated left ventricular EF = 61.6% Estimated left ventricular EF was in agreement with the calculated left ventricular EF. Left ventricular systolic function is normal.  · Left ventricular diastolic function is consistent with (grade I) impaired relaxation.  · Trace tricuspid valve regurgitation is present.  · Estimated right ventricular systolic pressure from tricuspid regurgitation is normal (<35 mmHg).  · Saline test results are negative.        Diagnoses: Patient with TIA is currently doing better.  Patient has fixed deficit from before.  Patient also has aneurysms.  Patient does not want any further testing.      Comment: Discussed with the primary team that if she is medically stable she can be discharged from the hospital.    Plan:  1.  If there is concern about placement nursing home might be the better option because of her age  2.  Patient requests no further  testing.  3.  Continue current plan as before.    Discussed with the patient and her care team and spent half an hour in face-to-face evaluation and management of the patient and will be signing off at this time.    Lindy Vincent MD

## 2021-10-17 NOTE — PLAN OF CARE
Problem: Adult Inpatient Plan of Care  Goal: Plan of Care Review  Outcome: Ongoing, Progressing  Flowsheets (Taken 10/17/2021 1852)  Progress: improving  Plan of Care Reviewed With: patient  Outcome Summary: vss, no falls, no pain, mri today, up to BSC x1 pivot, nih- 7, continue to monitor  Goal: Patient-Specific Goal (Individualized)  Outcome: Ongoing, Progressing  Goal: Absence of Hospital-Acquired Illness or Injury  Outcome: Ongoing, Progressing  Intervention: Identify and Manage Fall Risk  Recent Flowsheet Documentation  Taken 10/17/2021 1847 by Chantelle Mckeon RN  Safety Promotion/Fall Prevention:   activity supervised   assistive device/personal items within reach   clutter free environment maintained   fall prevention program maintained   nonskid shoes/slippers when out of bed   safety round/check completed   room organization consistent  Taken 10/17/2021 1658 by Chantelle Mckeon RN  Safety Promotion/Fall Prevention:   activity supervised   assistive device/personal items within reach   clutter free environment maintained   fall prevention program maintained   nonskid shoes/slippers when out of bed   room organization consistent   safety round/check completed  Taken 10/17/2021 1450 by Chantelle Mckeon RN  Safety Promotion/Fall Prevention:   activity supervised   assistive device/personal items within reach   clutter free environment maintained   fall prevention program maintained   nonskid shoes/slippers when out of bed   safety round/check completed   room organization consistent  Taken 10/17/2021 1202 by Chantelle Mckeon RN  Safety Promotion/Fall Prevention:   activity supervised   assistive device/personal items within reach   clutter free environment maintained   fall prevention program maintained   nonskid shoes/slippers when out of bed   room organization consistent   safety round/check completed  Taken 10/17/2021 1031 by Chantelle Mckeon RN  Safety Promotion/Fall Prevention:   assistive  device/personal items within reach   clutter free environment maintained   fall prevention program maintained   nonskid shoes/slippers when out of bed   safety round/check completed   room organization consistent  Taken 10/17/2021 0916 by Chantelle Mckeon RN  Safety Promotion/Fall Prevention:   activity supervised   assistive device/personal items within reach   clutter free environment maintained   fall prevention program maintained   nonskid shoes/slippers when out of bed   room organization consistent   safety round/check completed  Taken 10/17/2021 0745 by Chantelle Mckeon RN  Safety Promotion/Fall Prevention:   assistive device/personal items within reach   activity supervised   clutter free environment maintained   fall prevention program maintained   nonskid shoes/slippers when out of bed   safety round/check completed   room organization consistent  Intervention: Prevent Skin Injury  Recent Flowsheet Documentation  Taken 10/17/2021 0745 by Chantelle Mckeon RN  Body Position:   weight shift assistance provided   supine  Intervention: Prevent and Manage VTE (venous thromboembolism) Risk  Recent Flowsheet Documentation  Taken 10/17/2021 0916 by Chantelle Mckeon RN  VTE Prevention/Management:   bilateral   compression stockings on   sequential compression devices off   patient refused intervention  Goal: Optimal Comfort and Wellbeing  Outcome: Ongoing, Progressing  Goal: Readiness for Transition of Care  Outcome: Ongoing, Progressing     Problem: Fall Injury Risk  Goal: Absence of Fall and Fall-Related Injury  Outcome: Ongoing, Progressing  Intervention: Promote Injury-Free Environment  Recent Flowsheet Documentation  Taken 10/17/2021 1847 by Chantelle Mckeon RN  Safety Promotion/Fall Prevention:   activity supervised   assistive device/personal items within reach   clutter free environment maintained   fall prevention program maintained   nonskid shoes/slippers when out of bed   safety round/check  completed   room organization consistent  Taken 10/17/2021 1658 by Chantelle Mckeon RN  Safety Promotion/Fall Prevention:   activity supervised   assistive device/personal items within reach   clutter free environment maintained   fall prevention program maintained   nonskid shoes/slippers when out of bed   room organization consistent   safety round/check completed  Taken 10/17/2021 1450 by Chantelle Mckeon RN  Safety Promotion/Fall Prevention:   activity supervised   assistive device/personal items within reach   clutter free environment maintained   fall prevention program maintained   nonskid shoes/slippers when out of bed   safety round/check completed   room organization consistent  Taken 10/17/2021 1202 by Chantelle Mckeon RN  Safety Promotion/Fall Prevention:   activity supervised   assistive device/personal items within reach   clutter free environment maintained   fall prevention program maintained   nonskid shoes/slippers when out of bed   room organization consistent   safety round/check completed  Taken 10/17/2021 1031 by Chantelle Mckeon RN  Safety Promotion/Fall Prevention:   assistive device/personal items within reach   clutter free environment maintained   fall prevention program maintained   nonskid shoes/slippers when out of bed   safety round/check completed   room organization consistent  Taken 10/17/2021 0916 by Chantelle Mckeon RN  Safety Promotion/Fall Prevention:   activity supervised   assistive device/personal items within reach   clutter free environment maintained   fall prevention program maintained   nonskid shoes/slippers when out of bed   room organization consistent   safety round/check completed  Taken 10/17/2021 0745 by Chantelle Mckeon RN  Safety Promotion/Fall Prevention:   assistive device/personal items within reach   activity supervised   clutter free environment maintained   fall prevention program maintained   nonskid shoes/slippers when out of bed   safety  round/check completed   room organization consistent     Problem: Skin Injury Risk Increased  Goal: Skin Health and Integrity  Outcome: Ongoing, Progressing  Intervention: Optimize Skin Protection  Recent Flowsheet Documentation  Taken 10/17/2021 0916 by Chantelle Mckeon, RN  Pressure Reduction Techniques:   frequent weight shift encouraged   weight shift assistance provided   heels elevated off bed  Taken 10/17/2021 0745 by Chantelle Mckeon, RN  Head of Bed (HOB): HOB at 20-30 degrees   Goal Outcome Evaluation:  Plan of Care Reviewed With: patient        Progress: improving  Outcome Summary: vss, no falls, no pain, mri today, up to BSC x1 pivot, nih- 7, continue to monitor

## 2021-10-17 NOTE — PLAN OF CARE
Goal Outcome Evaluation:  Plan of Care Reviewed With: patient        Progress: improving  Outcome Summary: Pt cont to increase with bed mobility, transfers, and act moraima

## 2021-10-17 NOTE — PROGRESS NOTES
Name: Dionne Chavez ADMIT: 10/15/2021   : 1931  PCP: Provider, No Known    MRN: [id number] LOS: 2 days   AGE/SEX: 89 y.o. female  ROOM: [facility]/     Subjective   Subjective   Feeling okay today. Right-sided weakness not improved. no HA or visual changes. Tolerating po. Voiding well. +BM. No SOA or CP or palp.    Review of Systems   Constitutional: Negative for appetite change, chills, diaphoresis, fatigue and fever.   HENT: Negative for congestion, nosebleeds, sore throat, trouble swallowing and voice change.    Eyes: Negative for pain and visual disturbance.   Respiratory: Negative for cough, choking, chest tightness and shortness of breath.    Cardiovascular: Negative for chest pain, palpitations and leg swelling.   Gastrointestinal: Negative for abdominal pain, blood in stool, diarrhea, nausea and vomiting.   Endocrine: Negative for cold intolerance and heat intolerance.   Genitourinary: Negative for decreased urine volume, difficulty urinating, dysuria and hematuria.   Musculoskeletal: Negative for back pain and neck pain.   Skin: Negative for color change, pallor and rash.   Allergic/Immunologic: Negative for environmental allergies and food allergies.   Neurological: Positive for facial asymmetry and weakness (right sided). Negative for tremors, seizures, syncope, speech difficulty and headaches.   Hematological: Negative for adenopathy. Does not bruise/bleed easily.   Psychiatric/Behavioral: Negative for behavioral problems, confusion and hallucinations.        Objective   Objective   Vital Signs  Temp:  [97.3 °F (36.3 °C)-98.3 °F (36.8 °C)] 97.3 °F (36.3 °C)  Heart Rate:  [66-75] 66  Resp:  [16-18] 16  BP: (128-141)/(53-84) 131/84  SpO2:  [91 %-99 %] 91 %  on  Flow (L/min):  [2] 2;   Device (Oxygen Therapy): room air  Body mass index is 27.63 kg/m².  Physical Exam  Vitals and nursing note reviewed. Exam conducted with a chaperone present (dtr).   Constitutional:       General: She is not in acute  distress.     Appearance: She is not ill-appearing, toxic-appearing or diaphoretic.   HENT:      Head: Normocephalic and atraumatic.      Right Ear: External ear normal.      Left Ear: External ear normal.      Nose: Nose normal.      Mouth/Throat:      Mouth: Mucous membranes are moist.      Pharynx: Oropharynx is clear.   Eyes:      General: No scleral icterus.        Right eye: No discharge.         Left eye: No discharge.      Extraocular Movements: Extraocular movements intact.      Conjunctiva/sclera: Conjunctivae normal.   Cardiovascular:      Rate and Rhythm: Normal rate and regular rhythm.      Pulses: Normal pulses.      Heart sounds: Normal heart sounds.   Pulmonary:      Effort: Pulmonary effort is normal. No respiratory distress.      Breath sounds: Normal breath sounds.   Abdominal:      General: Bowel sounds are normal. There is no distension.      Palpations: Abdomen is soft.      Tenderness: There is no abdominal tenderness.   Musculoskeletal:         General: Swelling (trace edema in BLEs, chronic) present. Normal range of motion.      Cervical back: Neck supple. No rigidity.   Lymphadenopathy:      Cervical: No cervical adenopathy.   Skin:     General: Skin is warm and dry.      Capillary Refill: Capillary refill takes less than 2 seconds.      Coloration: Skin is not jaundiced.      Findings: No rash.   Neurological:      Mental Status: She is alert and oriented to person, place, and time.      Cranial Nerves: No cranial nerve deficit.      Comments: Right sided weakness in UE and LE  Very mild right facial weakness   Psychiatric:         Mood and Affect: Mood normal.         Behavior: Behavior normal.         Thought Content: Thought content normal.      Comments: Very pleasant         Results Review     I reviewed the patient's new clinical results.  Results from last 7 days   Lab Units 10/17/21  0705 10/16/21  0720 10/15/21  1402   WBC 10*3/mm3 7.40 7.03 8.29   HEMOGLOBIN g/dL 12.5 12.3 12.8    PLATELETS 10*3/mm3 341 369 352     Results from last 7 days   Lab Units 10/17/21  0705 10/16/21  0720 10/15/21  1402   SODIUM mmol/L 141 141 138   POTASSIUM mmol/L 4.0 4.0 4.0   CHLORIDE mmol/L 105 105 101   CO2 mmol/L 25.6 25.9 25.0   BUN mg/dL 14 11 13   CREATININE mg/dL 0.88 0.85 0.92   GLUCOSE mg/dL 92 91 111*   Estimated Creatinine Clearance: 40.1 mL/min (by C-G formula based on SCr of 0.88 mg/dL).  Results from last 7 days   Lab Units 10/17/21  0705 10/16/21  0720 10/15/21  1402   ALBUMIN g/dL 3.90 4.30 4.80   BILIRUBIN mg/dL 0.3 0.5 0.3   ALK PHOS U/L 60 63 73   AST (SGOT) U/L 14 16 17   ALT (SGPT) U/L 11 11 15     Results from last 7 days   Lab Units 10/17/21  0705 10/16/21  0720 10/15/21  1402   CALCIUM mg/dL 8.5 8.7 9.5   ALBUMIN g/dL 3.90 4.30 4.80   MAGNESIUM mg/dL 2.2  --   --        COVID19   Date Value Ref Range Status   10/15/2021 Not Detected Not Detected - Ref. Range Final     Hemoglobin A1C   Date/Time Value Ref Range Status   10/16/2021 0720 5.10 4.80 - 5.60 % Final     Glucose   Date/Time Value Ref Range Status   10/16/2021 0610 98 70 - 130 mg/dL Final     Comment:     Meter: [id number] : [id number] Danielito MATOS   10/15/2021 2039 110 70 - 130 mg/dL Final     Comment:     Meter: [id number] : [id number] Danielito MATOS       Adult transthoracic echo complete  Addendum: · Calculated left ventricular EF = 61.6% Estimated left ventricular EF was  in agreement with the calculated left ventricular EF. Left ventricular  systolic function is normal.   · Left ventricular diastolic function is consistent with (grade I)  impaired relaxation.   · Trace tricuspid valve regurgitation is present.   · Estimated right ventricular systolic pressure from tricuspid  regurgitation is normal (<35 mmHg).   · Saline test results are negative.  Narrative: · Calculated left ventricular EF = 61.6% Estimated left ventricular EF was   in agreement with the calculated left ventricular EF. Left ventricular    systolic function is normal.  · Left ventricular diastolic function is consistent with (grade I)   impaired relaxation.  · Trace tricuspid valve regurgitation is present.  · Estimated right ventricular systolic pressure from tricuspid   regurgitation is normal (<35 mmHg).       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       Very pleasant 89yo woman with h/o left-sided CVA with residual right hemiparesis about 5 years ago, who was living in SNF until about a week ago when she moved back into her own home with family. Last week she began to suffer some worsening of her right-sided weakness, but did not tell anyone. She woke from sleep yesterday with her right-sided weakness much worse and then presented to ER.    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  · Full code.  · Discussed with patient. D/w Dr. Vincent.  · Anticipate discharge to SNF based on PT/OT eval and recs here.      Jere Floyd MD  [facility]  10/17/21  12:49 EDT

## 2021-10-17 NOTE — PLAN OF CARE
Goal Outcome Evaluation:  Plan of Care Reviewed With: patient        Progress: improving  Outcome Summary: patient alert and oriented, vitals stable, NIH score of 3, no new complaints, continue to monitor patient, maintain fall free environment

## 2021-10-17 NOTE — THERAPY PROGRESS REPORT/RE-CERT
Patient Name: Dionne Chavez  : 1931    MRN: [id number]                              Today's Date: 10/17/2021       Admit Date: 10/15/2021    Visit Dx:     ICD-10-CM ICD-9-CM   1. 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)   • History of CVA (cerebrovascular accident)   • HTN (hypertension)   • Aneurysm (HCC)   • Hyperlipidemia   • Seizure disorder (HCC)     Past Medical History:   Diagnosis Date   • Anemia    • Aneurysm (HCC)     brain aneurysm, unruptured   • Arthritis    • Back pain    • Elevated cholesterol    • Hypertension    • Intestinal gangrene (HCC)    • Loose stools    • Right sided weakness    • Stroke (HCC)      Past Surgical History:   Procedure Laterality Date   • APPENDECTOMY     • CHOLECYSTECTOMY     • HIP SURGERY Right    • HYSTERECTOMY        General Information     Row Name 10/17/21 1331          Physical Therapy Time and Intention    Document Type therapy note (daily note)  -CW     Mode of Treatment physical therapy  -CW     Row Name 10/17/21 1331          General Information    Patient Profile Reviewed yes  -CW     Existing Precautions/Restrictions fall  -CW     Row Name 10/17/21 1331          Cognition    Orientation Status (Cognition) oriented x 3  -CW     Row Name 10/17/21 1331          Safety Issues, Functional Mobility    Impairments Affecting Function (Mobility) balance; coordination; endurance/activity tolerance; strength; postural/trunk control; range of motion (ROM); motor control; motor planning  -CW           User Key  (r) = Recorded By, (t) = Taken By, (c) = Cosigned By    Initials Name Provider Type    CW Ibrahima Lucero, PTA Physical Therapy Assistant               Mobility     Row Name 10/17/21 1331          Bed Mobility    Bed Mobility supine-sit; sit-supine;  scooting/bridging  -CW     Scooting/Bridging [place] (Bed Mobility) moderate assist (50% patient effort); verbal cues; nonverbal cues (demo/gesture)  -CW     Supine-Sit [place] (Bed Mobility) minimum assist (75% patient effort); verbal cues  -CW     Sit-Supine [place] (Bed Mobility) moderate assist (50% patient effort); verbal cues  -CW     Assistive Device (Bed Mobility) bed rails; head of bed elevated; draw sheet  -CW     Row Name 10/17/21 1331          Bed-Chair Transfer    Bed-Chair [place] (Transfers) moderate assist (50% patient effort); 2 person assist; verbal cues; nonverbal cues (demo/gesture)  -CW     Assistive Device (Bed-Chair Transfers) walker, front-wheeled  -CW     Row Name 10/17/21 1331          Sit-Stand Transfer    Sit-Stand [place] (Transfers) minimum assist (75% patient effort); verbal cues  -CW     Assistive Device (Sit-Stand Transfers) walker, front-wheeled  -CW     Row Name 10/17/21 1331          Gait/Stairs (Locomotion)    [place] Level (Gait) not tested  -CW           User Key  (r) = Recorded By, (t) = Taken By, (c) = Cosigned By    Initials Name Provider Type    CW Ibrahima Lucero, PTA Physical Therapy Assistant               Obj/Interventions    No documentation.                Goals/Plan    No documentation.                Clinical Impression     Row Name 10/17/21 1331          Plan of Care Review    Plan of Care Reviewed With patient  -CW     Progress improving  -CW     Outcome Summary Pt cont to increase with bed mobility, transfers, and act moraima  -CW     Row Name 10/17/21 1331          Therapy Assessment/Plan (PT)    Rehab Potential (PT) good, to achieve stated therapy goals  -CW     Criteria for Skilled Interventions Met (PT) yes  -CW     Row Name 10/17/21 1331          Positioning and Restraints    Pre-Treatment Position in bed  -CW     Post Treatment Position bed  -CW     In Bed notified nsg; supine; call light within reach; encouraged to call for  assist; exit alarm on  -CW           User Key  (r) = Recorded By, (t) = Taken By, (c) = Cosigned By    Initials Name Provider Type    Ibrahima Aguilera PTA Physical Therapy Assistant               Outcome Measures     Row Name 10/17/21 1332          How much help from another person do you currently need...    Turning from your back to your side while in flat bed without using bedrails? 2  -CW     Moving from lying on back to sitting on the side of a flat bed without bedrails? 2  -CW     Moving to and from a bed to a chair (including a wheelchair)? 2  -CW     Standing up from a chair using your arms (e.g., wheelchair, bedside chair)? 2  -CW     Climbing 3-5 steps with a railing? 1  -CW     To walk in hospital room? 1  -CW     AM-PAC 6 Clicks Score (PT) 10  -CW           User Key  (r) = Recorded By, (t) = Taken By, (c) = Cosigned By    Initials Name Provider Type    Ibrahima Aguilera PTA Physical Therapy Assistant                             Physical Therapy Education                 Title: PT OT SLP Therapies (In Progress)     Topic: Physical Therapy (Done)     Point: Mobility training (Done)     Learning Progress Summary           Patient Acceptance, E,TB,D, VU,NR by  at 10/16/2021 1328                   Point: Home exercise program (Done)     Learning Progress Summary           Patient Acceptance, E,TB,D, VU,NR by  at 10/16/2021 1328                   Point: Body mechanics (Done)     Learning Progress Summary           Patient Acceptance, E,TB,D, VU,NR by CB at 10/16/2021 1328                   Point: Precautions (Done)     Learning Progress Summary           Patient Acceptance, E,TB,D, VU,NR by CB at 10/16/2021 1328                               User Key     Initials Effective Dates Name Provider Type Discipline     12/30/20 -  Carlee Medina Physical Therapist PT              PT Recommendation and Plan     Plan of Care Reviewed With: patient  Progress: improving  Outcome Summary: Pt cont to  increase with bed mobility, transfers, and act moraima     Time Calculation:    PT Charges     Row Name 10/17/21 1333             Time Calculation    Start Time 1315  -CW      Stop Time 1333  -CW      Time Calculation (min) 18 min  -CW      PT Received On 10/17/21  -CW      PT - Next Appointment 10/18/21  -CW              Timed Charges    57846 - PT Therapeutic Activity Minutes 18  -CW              Total Minutes    Timed Charges Total Minutes 18  -CW       Total Minutes 18  -CW            User Key  (r) = Recorded By, (t) = Taken By, (c) = Cosigned By    Initials Name Provider Type    Ibrahima Aguilera PTA Physical Therapy Assistant              Therapy Charges for Today     Code Description Service Date Service Provider Modifiers Qty    [id number] HC PT THERAPEUTIC ACT EA 15 MIN 10/17/2021 Ibrahima Lucero PTA GP 1          PT G-Codes  Outcome Measure Options: AM-PAC 6 Clicks Daily Activity (OT), Modified [place]  AM-PAC 6 Clicks Score (PT): 10  AM-PAC 6 Clicks Score (OT): 13  Modified [place] Scale: 4 - Moderately severe disability.  Unable to walk without assistance, and unable to attend to own bodily needs without assistance.    Ibrahima Lucero PTA  10/17/2021

## 2021-10-18 LAB
ANION GAP SERPL CALCULATED.3IONS-SCNC: 11.9 MMOL/L (ref 5–15)
BUN SERPL-MCNC: 17 MG/DL (ref 8–23)
BUN/CREAT SERPL: 17.7 (ref 7–25)
CALCIUM SPEC-SCNC: 8.7 MG/DL (ref 8.2–9.6)
CHLORIDE SERPL-SCNC: 99 MMOL/L (ref 98–107)
CO2 SERPL-SCNC: 25.1 MMOL/L (ref 22–29)
CREAT SERPL-MCNC: 0.96 MG/DL (ref 0.57–1)
DEPRECATED RDW RBC AUTO: 44.7 FL (ref 37–54)
ERYTHROCYTE [DISTWIDTH] IN BLOOD BY AUTOMATED COUNT: 13.1 % (ref 12.3–15.4)
GFR SERPL CREATININE-BSD FRML MDRD: 55 ML/MIN/1.73
GLUCOSE SERPL-MCNC: 104 MG/DL (ref 65–99)
HCT VFR BLD AUTO: 39.6 % (ref 34–46.6)
HGB BLD-MCNC: 12.5 G/DL (ref 12–15.9)
MAGNESIUM SERPL-MCNC: 2.2 MG/DL (ref 1.6–2.4)
MCH RBC QN AUTO: 29.4 PG (ref 26.6–33)
MCHC RBC AUTO-ENTMCNC: 31.6 G/DL (ref 31.5–35.7)
MCV RBC AUTO: 93.2 FL (ref 79–97)
PLATELET # BLD AUTO: 357 10*3/MM3 (ref 140–450)
PMV BLD AUTO: 9 FL (ref 6–12)
POTASSIUM SERPL-SCNC: 4 MMOL/L (ref 3.5–5.2)
RBC # BLD AUTO: 4.25 10*6/MM3 (ref 3.77–5.28)
SODIUM SERPL-SCNC: 136 MMOL/L (ref 136–145)
WBC # BLD AUTO: 7.18 10*3/MM3 (ref 3.4–10.8)

## 2021-10-18 PROCEDURE — 97110 THERAPEUTIC EXERCISES: CPT

## 2021-10-18 PROCEDURE — 97535 SELF CARE MNGMENT TRAINING: CPT

## 2021-10-18 PROCEDURE — 99222 1ST HOSP IP/OBS MODERATE 55: CPT | Performed by: NURSE PRACTITIONER

## 2021-10-18 PROCEDURE — 85027 COMPLETE CBC AUTOMATED: CPT | Performed by: HOSPITALIST

## 2021-10-18 PROCEDURE — 80048 BASIC METABOLIC PNL TOTAL CA: CPT | Performed by: HOSPITALIST

## 2021-10-18 PROCEDURE — 97530 THERAPEUTIC ACTIVITIES: CPT | Performed by: PHYSICAL THERAPIST

## 2021-10-18 PROCEDURE — 83735 ASSAY OF MAGNESIUM: CPT | Performed by: HOSPITALIST

## 2021-10-18 RX ADMIN — Medication 1 TABLET: at 08:33

## 2021-10-18 RX ADMIN — CLOPIDOGREL 75 MG: 75 TABLET, FILM COATED ORAL at 08:32

## 2021-10-18 RX ADMIN — METOPROLOL SUCCINATE 50 MG: 50 TABLET, EXTENDED RELEASE ORAL at 08:32

## 2021-10-18 RX ADMIN — GABAPENTIN 200 MG: 100 CAPSULE ORAL at 08:33

## 2021-10-18 RX ADMIN — GABAPENTIN 200 MG: 100 CAPSULE ORAL at 14:49

## 2021-10-18 RX ADMIN — AMLODIPINE BESYLATE 10 MG: 10 TABLET ORAL at 20:58

## 2021-10-18 RX ADMIN — ASPIRIN 81 MG: 81 TABLET, COATED ORAL at 08:32

## 2021-10-18 RX ADMIN — LEVETIRACETAM 250 MG: 250 TABLET, FILM COATED ORAL at 08:33

## 2021-10-18 RX ADMIN — ATORVASTATIN CALCIUM 10 MG: 20 TABLET, FILM COATED ORAL at 20:57

## 2021-10-18 RX ADMIN — POTASSIUM CHLORIDE 10 MEQ: 750 TABLET, EXTENDED RELEASE ORAL at 08:32

## 2021-10-18 RX ADMIN — SODIUM CHLORIDE, PRESERVATIVE FREE 10 ML: 5 INJECTION INTRAVENOUS at 20:58

## 2021-10-18 RX ADMIN — SODIUM CHLORIDE, PRESERVATIVE FREE 10 ML: 5 INJECTION INTRAVENOUS at 08:33

## 2021-10-18 RX ADMIN — GABAPENTIN 200 MG: 100 CAPSULE ORAL at 20:57

## 2021-10-18 RX ADMIN — LEVETIRACETAM 250 MG: 250 TABLET, FILM COATED ORAL at 20:58

## 2021-10-18 NOTE — THERAPY TREATMENT NOTE
Patient Name: Dionne Chavez  : 1931    MRN: [id number]                              Today's Date: 10/18/2021       Admit Date: 10/15/2021    Visit Dx:     ICD-10-CM ICD-9-CM   1. 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)   • History of CVA (cerebrovascular accident)   • HTN (hypertension)   • Aneurysm (HCC)   • Hyperlipidemia   • Seizure disorder (HCC)     Past Medical History:   Diagnosis Date   • Anemia    • Aneurysm (HCC)     brain aneurysm, unruptured   • Arthritis    • Back pain    • Elevated cholesterol    • Hypertension    • Intestinal gangrene (HCC)    • Loose stools    • Right sided weakness    • Stroke (HCC)      Past Surgical History:   Procedure Laterality Date   • APPENDECTOMY     • CHOLECYSTECTOMY     • HIP SURGERY Right    • HYSTERECTOMY        General Information     Row Name 10/18/21 1638          Physical Therapy Time and Intention    Document Type therapy note (daily note)  -     Mode of Treatment physical therapy  -           User Key  (r) = Recorded By, (t) = Taken By, (c) = Cosigned By    Initials Name Provider Type     Aurea Whittington, PT Physical Therapist               Mobility     Row Name 10/18/21 1636          Bed Mobility    Bed Mobility supine-sit; sit-supine  -     Supine-Sit Kay (Bed Mobility) moderate assist (50% patient effort); verbal cues  -     Sit-Supine Kay (Bed Mobility) moderate assist (50% patient effort); verbal cues  -     Assistive Device (Bed Mobility) bed rails; head of bed elevated  -     Row Name 10/18/21 1634          Sit-Stand Transfer    Sit-Stand Kay (Transfers) minimum assist (75% patient effort); verbal cues  -     Assistive Device (Sit-Stand Transfers) walker, front-wheeled  -     Row Name 10/18/21 3275           Gait/Stairs (Locomotion)    Titus Level (Gait) minimum assist (75% patient effort)  -ROOPA     Assistive Device (Gait) walker, front-wheeled  -     Distance in Feet (Gait) 12  -KH     Deviations/Abnormal Patterns (Gait) gait speed decreased; tony decreased; stride length decreased  -ROOPA     Comment (Gait/Stairs) ambualted around the bed with AFO and shoes donned  -           User Key  (r) = Recorded By, (t) = Taken By, (c) = Cosigned By    Initials Name Provider Type    Aurea Lamar, PT Physical Therapist               Obj/Interventions    No documentation.                Goals/Plan    No documentation.                Clinical Impression     Row Name 10/18/21 1640          Pain    Additional Documentation Pain Scale: Numbers Pre/Post-Treatment (Group)  -ROOPA     Row Name 10/18/21 1640          Pain Scale: Numbers Pre/Post-Treatment    Pretreatment Pain Rating 0/10 - no pain  -ROOPA     Posttreatment Pain Rating 0/10 - no pain  -     Row Name 10/18/21 1640          Plan of Care Review    Plan of Care Reviewed With patient  -ROOPA     Outcome Summary Pt was agreeable to therapy. She required mod A to transfer to EOB, but stood and ambulated with min A using Rwx and AFO donned on R foot.  -ROOPA           User Key  (r) = Recorded By, (t) = Taken By, (c) = Cosigned By    Initials Name Provider Type    Aurea Lamar, PT Physical Therapist               Outcome Measures     Row Name 10/18/21 2412          How much help from another person do you currently need...    Turning from your back to your side while in flat bed without using bedrails? 2  -KH     Moving from lying on back to sitting on the side of a flat bed without bedrails? 2  -KH     Moving to and from a bed to a chair (including a wheelchair)? 3  -KH     Standing up from a chair using your arms (e.g., wheelchair, bedside chair)? 3  -KH     Climbing 3-5 steps with a railing? 1  -KH     To walk in hospital room? 3  -KH     AM-PAC  6 Clicks Score (PT) 14  -     Row Name 10/18/21 1642          Functional Assessment    Outcome Measure Options AM-PAC 6 Clicks Basic Mobility (PT)  -           User Key  (r) = Recorded By, (t) = Taken By, (c) = Cosigned By    Initials Name Provider Type    Aurea Lamar, PT Physical Therapist                             Physical Therapy Education                 Title: PT OT SLP Therapies (In Progress)     Topic: Physical Therapy (Done)     Point: Mobility training (Done)     Learning Progress Summary           Patient Acceptance, E, VU by  at 10/18/2021 1642    Acceptance, E,TB,D, VU,NR by CB at 10/16/2021 1328                   Point: Home exercise program (Done)     Learning Progress Summary           Patient Acceptance, E, VU by  at 10/18/2021 1642    Acceptance, E,TB,D, VU,NR by CB at 10/16/2021 1328                   Point: Body mechanics (Done)     Learning Progress Summary           Patient Acceptance, E, VU by  at 10/18/2021 1642    Acceptance, E,TB,D, VU,NR by CB at 10/16/2021 1328                   Point: Precautions (Done)     Learning Progress Summary           Patient Acceptance, E, VU by  at 10/18/2021 1642    Acceptance, E,TB,D, VU,NR by CB at 10/16/2021 1328                               User Key     Initials Effective Dates Name Provider Type [facility] 06/16/21 -  Aurea Whittington, PT Physical Therapist PT    [facility] 12/30/20 -  Carlee Medina Physical Therapist PT              PT Recommendation and Plan     Plan of Care Reviewed With: patient  Outcome Summary: Pt was agreeable to therapy. She required mod A to transfer to EOB, but stood and ambulated with min A using Rwx and AFO donned on R foot.     Time Calculation:    PT Charges     Row Name 10/18/21 1643             Time Calculation    Start Time 1500  -      Stop Time 1515  -      Time Calculation (min) 15 min  -      PT Received On 10/18/21  -      PT - Next Appointment 10/19/21  -               Time Calculation- PT    Total Timed Code Minutes- PT 15 minute(s)  -KH              Timed Charges    99529 - PT Therapeutic Activity Minutes 15  -KH              Total Minutes    Timed Charges Total Minutes 15  -KH       Total Minutes 15  -KH            User Key  (r) = Recorded By, (t) = Taken By, (c) = Cosigned By    Initials Name Provider Type    Aurea Lamar, PT Physical Therapist              Therapy Charges for Today     Code Description Service Date Service Provider Modifiers Qty    95916246030 HC PT THERAPEUTIC ACT EA 15 MIN 10/18/2021 Aurea Whittington, PT GP 1          PT G-Codes  Outcome Measure Options: AM-PAC 6 Clicks Basic Mobility (PT)  AM-PAC 6 Clicks Score (PT): 14  AM-PAC 6 Clicks Score (OT): 15  Modified [place] Scale: 4 - Moderately severe disability.  Unable to walk without assistance, and unable to attend to own bodily needs without assistance.    Aurea Whittington PT  10/18/2021

## 2021-10-18 NOTE — PLAN OF CARE
Problem: Adult Inpatient Plan of Care  Goal: Absence of Hospital-Acquired Illness or Injury  Intervention: Prevent Skin Injury  Recent Flowsheet Documentation  Taken 10/18/2021 0601 by Roberta Walsh RN  Body Position: side-lying, right  Taken 10/18/2021 0348 by Roberta Walsh RN  Body Position: side-lying, left  Taken 10/18/2021 0318 by Roberta Walsh RN  Body Position: side-lying, left  Taken 10/18/2021 0200 by Roberta Walsh RN  Body Position: side-lying, left  Taken 10/18/2021 0005 by Roberta Walsh RN  Body Position: side-lying, left  Skin Protection: adhesive use limited  Taken 10/17/2021 2201 by Roberta Walsh RN  Body Position: position maintained  Taken 10/17/2021 1948 by Roberta Walsh RN  Body Position: sitting up in bed  Skin Protection: adhesive use limited   Goal Outcome Evaluation:  Plan of Care Reviewed With: patient        Progress: improving

## 2021-10-18 NOTE — CASE MANAGEMENT/SOCIAL WORK
Continued Stay Note  [facility]     Patient Name: Dionne Chavez  MRN: [id number]  Today's Date: 10/18/2021    Admit Date: 10/15/2021     Discharge Plan     Row Name 10/18/21 1612       Plan    Plan [facility] vs [place].    Plan Comments S/W [facility]/ [facility] - no beds available.  [facility] eval pending. S/W Nahomy/ Shanika who states no beds available at [facility], but bed would be available at [place] if pt is agreeable.  Pt notified.  First choice is [facility], but she is in agreement / [place] as backup. Nah[facility]/ Shanika notified. ............sk               Discharge Codes    No documentation.                     Lindsay Duran RN

## 2021-10-18 NOTE — THERAPY TREATMENT NOTE
Patient Name: Dionne Chavez  : 1931    MRN: [id number]                              Today's Date: 10/18/2021       Admit Date: 10/15/2021    Visit Dx:     ICD-10-CM ICD-9-CM   1. 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)   • History of CVA (cerebrovascular accident)   • HTN (hypertension)   • Aneurysm (HCC)   • Hyperlipidemia   • Seizure disorder (HCC)     Past Medical History:   Diagnosis Date   • Anemia    • Aneurysm (HCC)     brain aneurysm, unruptured   • Arthritis    • Back pain    • Elevated cholesterol    • Hypertension    • Intestinal gangrene (HCC)    • Loose stools    • Right sided weakness    • Stroke (HCC)      Past Surgical History:   Procedure Laterality Date   • APPENDECTOMY     • CHOLECYSTECTOMY     • HIP SURGERY Right    • HYSTERECTOMY        General Information     Row Name 10/18/21 1421          OT Time and Intention    Document Type therapy note (daily note)  -RD     Mode of Treatment occupational therapy  -RD     Row Name 10/18/21 1421          General Information    Patient Profile Reviewed yes  -RD     Existing Precautions/Restrictions fall  -RD     Row Name 10/18/21 1421          Cognition    Orientation Status (Cognition) oriented x 4  -RD           User Key  (r) = Recorded By, (t) = Taken By, (c) = Cosigned By    Initials Name Provider Type    RD Joy Dash, TATIANNA Occupational Therapist                 Mobility/ADL's     Row Name 10/18/21 1421          Bed Mobility    Bed Mobility supine-sit; sit-supine  -RD     Supine-Sit [place] (Bed Mobility) moderate assist (50% patient effort); verbal cues  -RD     Sit-Supine [place] (Bed Mobility) moderate assist (50% patient effort); verbal cues  -RD     Assistive Device (Bed Mobility) bed rails; head of bed elevated  -RD     Row  Name 10/18/21 1421          Transfers    Transfers sit-stand transfer; toilet transfer  -RD     Sit-Stand [place] (Transfers) minimum assist (75% patient effort); verbal cues  pt does not come to full standing position  -RD     [place] Level (Toilet Transfer) minimum assist (75% patient effort); verbal cues  -RD     Assistive Device (Toilet Transfer) commode, bedside without drop arms  -RD     Row Name 10/18/21 1421          Toilet Transfer    Type (Toilet Transfer) stand pivot/stand step  -RD     Row Name 10/18/21 1421          Activities of Daily Living    BADL Assessment/Intervention lower body dressing; grooming; toileting  -RD     Row Name 10/18/21 1421          Lower Body Dressing Assessment/Training    [place] Level (Lower Body Dressing) lower body dressing skills; don; socks; dependent (less than 25% patient effort)  -RD     Position (Lower Body Dressing) edge of bed sitting  -RD     Comment (Lower Body Dressing) pt reports she requires assist to don socks at baseline  -RD     Row Name 10/18/21 1421          Grooming Assessment/Training    [place] Level (Grooming) grooming skills; set up; supervision  -RD     Position (Grooming) edge of bed sitting  -RD     Row Name 10/18/21 1421          Toileting Assessment/Training    [place] Level (Toileting) toileting skills; adjust/manage clothing; perform perineal hygiene; moderate assist (50% patient effort); verbal cues  -RD     Assistive Devices (Toileting) commode, bedside without drop arms  -RD     Position (Toileting) supported sitting; supported standing  -RD     Comment (Toileting) pt able to complete bowel hygiene from sitting position, but does require assist w/ clothing mgnt before and after hygiene  -RD           User Key  (r) = Recorded By, (t) = Taken By, (c) = Cosigned By    Initials Name Provider Type    Joy Leal OT Occupational Therapist               Obj/Interventions     Row Name 10/18/21 1424           Shoulder (Therapeutic Exercise)    Shoulder (Therapeutic Exercise) AROM (active range of motion)  -RD     Shoulder AROM (Therapeutic Exercise) bilateral; flexion; extension; scapular protraction; scapular retraction; 10 repetitions; sitting  -RD     Row Name 10/18/21 1423          Elbow/Forearm (Therapeutic Exercise)    Elbow/Forearm (Therapeutic Exercise) AROM (active range of motion)  -RD     Elbow/Forearm AROM (Therapeutic Exercise) bilateral; flexion; extension; 10 repetitions; sitting  -RD     Row Name 10/18/21 1423          Wrist (Therapeutic Exercise)    Wrist (Therapeutic Exercise) AROM (active range of motion)  -RD     Wrist AROM (Therapeutic Exercise) bilateral; flexion; extension; 10 repetitions  -RD     Row Name 10/18/21 1423          Hand (Therapeutic Exercise)    Hand (Therapeutic Exercise) AROM (active range of motion)  -RD     Hand AROM/AAROM (Therapeutic Exercise) AROM (active range of motion); bilateral; finger flexion; finger extension; 10 repetitions  -RD     Row Name 10/18/21 1423          Balance    Balance Assessment sitting static balance; standing static balance  -     Static Sitting Balance WFL; sitting, edge of bed  -     Static Standing Balance moderate impairment; standing; supported  -RD     Row Name 10/18/21 1423          Therapeutic Exercise    Therapeutic Exercise shoulder; elbow/forearm; wrist; hand  -RD           User Key  (r) = Recorded By, (t) = Taken By, (c) = Cosigned By    Initials Name Provider Type    Joy Leal, OT Occupational Therapist               Goals/Plan    No documentation.                Clinical Impression     Row Name 10/18/21 1434          Pain Scale: Numbers Pre/Post-Treatment    Pretreatment Pain Rating 0/10 - no pain  -RD     Posttreatment Pain Rating 0/10 - no pain  -RD     Row Name 10/18/21 8163          Plan of Care Review    Plan of Care Reviewed With patient  -RD     Progress improving  -RD     Outcome Summary Pt tolerates OT treatment  well. Pt able to complete stand pivot transfer from EOB <> BSC w/ min A. Pt requires mod A for toileting tasks- pt able to complete toileting hygiene from seated position but requires assist for clothing mgnt before and after hygiene. Pt requires total A for LB dressing but states she required assist w/ this at PLOF. Pt completes grooming w/ s/u. Pt tolerates UB thera ex well. Pt motivated and hopeful to discharge to inpatient rehab. Will continue to progress as tolerated. OT wore standard mask, gloves, glasses, and performed thorough hand hygiene before and after session.  -RD     Row Name 10/18/21 1434          Therapy Plan Review/Discharge Plan (OT)    Anticipated Discharge Disposition (OT) inpatient rehabilitation facility; skilled nursing facility  -RD     Row Name 10/18/21 1435          Vital Signs    O2 Delivery Pre Treatment room air  -RD     O2 Delivery Intra Treatment room air  -RD     O2 Delivery Post Treatment room air  -RD     Row Name 10/18/21 1436          Positioning and Restraints    Pre-Treatment Position in bed  -RD     Post Treatment Position bed  -RD     In Bed fowlers; call light within reach; encouraged to call for assist; exit alarm on  -RD           User Key  (r) = Recorded By, (t) = Taken By, (c) = Cosigned By    Initials Name Provider Type    Joy Leal, OT Occupational Therapist               Outcome Measures     Row Name 10/18/21 1435          How much help from another is currently needed...    Putting on and taking off regular lower body clothing? 2  -RD     Bathing (including washing, rinsing, and drying) 2  -RD     Toileting (which includes using toilet bed pan or urinal) 2  -RD     Putting on and taking off regular upper body clothing 3  -RD     Taking care of personal grooming (such as brushing teeth) 3  -RD     Eating meals 3  -RD     AM-PAC 6 Clicks Score (OT) 15  -RD           User Key  (r) = Recorded By, (t) = Taken By, (c) = Cosigned By    Initials Name Provider  Type    RD Joy Dash OT Occupational Therapist                Occupational Therapy Education                 Title: PT OT SLP Therapies (In Progress)     Topic: Occupational Therapy (In Progress)     Point: ADL training (Done)     Description:   Instruct learner(s) on proper safety adaptation and remediation techniques during self care or transfers.   Instruct in proper use of assistive devices.              Learning Progress Summary           Patient Acceptance, GALINA, VU by  at 10/16/2021 9633    Comment: The role of OT                   Point: Home exercise program (Not Started)     Description:   Instruct learner(s) on appropriate technique for monitoring, assisting and/or progressing therapeutic exercises/activities.              Learner Progress:  Not documented in this visit.          Point: Precautions (Not Started)     Description:   Instruct learner(s) on prescribed precautions during self-care and functional transfers.              Learner Progress:  Not documented in this visit.          Point: Body mechanics (Not Started)     Description:   Instruct learner(s) on proper positioning and spine alignment during self-care, functional mobility activities and/or exercises.              Learner Progress:  Not documented in this visit.                      User Key     Initials Effective Dates Name Provider Type Discipline     01/05/21 -  Roe Wong OT Occupational Therapist OT              OT Recommendation and Plan     Plan of Care Review  Plan of Care Reviewed With: patient  Progress: improving  Outcome Summary: Pt tolerates OT treatment well. Pt able to complete stand pivot transfer from EOB <> BSC w/ min A. Pt requires mod A for toileting tasks- pt able to complete toileting hygiene from seated position but requires assist for clothing mgnt before and after hygiene. Pt requires total A for LB dressing but states she required assist w/ this at PLOF. Pt completes grooming w/ s/u. Pt  tolerates UB thera ex well. Pt motivated and hopeful to discharge to inpatient rehab. Will continue to progress as tolerated. OT wore standard mask, gloves, glasses, and performed thorough hand hygiene before and after session.     Time Calculation:    Time Calculation- OT     Row Name 10/18/21 1437             Time Calculation- OT    OT Start Time 1400  -RD      OT Stop Time 1430  -RD      OT Time Calculation (min) 30 min  -RD      Total Timed Code Minutes- OT 30 minute(s)  -RD      OT Received On 10/18/21  -RD      OT - Next Appointment 10/19/21  -RD              Timed Charges    65833 - OT Therapeutic Exercise Minutes 15  -RD      31901 - OT Self Care/Mgmt Minutes 15  -RD              Total Minutes    Timed Charges Total Minutes 30  -RD       Total Minutes 30  -RD            User Key  (r) = Recorded By, (t) = Taken By, (c) = Cosigned By    Initials Name Provider Type    RD Joy Dash OT Occupational Therapist              Therapy Charges for Today     Code Description Service Date Service Provider Modifiers Qty    69810162255  OT THER PROC EA 15 MIN 10/18/2021 Joy Dash OT GO 1    [id number] HC OT SELF CARE/MGMT/TRAIN EA 15 MIN 10/18/2021 Joy Dash OT GO 1               Joy Dash OT  10/18/2021

## 2021-10-18 NOTE — CONSULTS
[facility] NEUROSURGERY CONSULT NOTE    Patient name: Dionne Chavez  Referring Provider: Dr. Floyd  Reason for Consultation: aneurysm    Patient Care Team:  Chantelle Rodriguez APRN as PCP - General (Nurse Practitioner)    Chief complaint: right side weakness    Subjective .     History of present illness:    Patient is a 89 y.o.  female with history of prior left CVA with resultant right-sided weakness.  Patient recently left long-term care and return to living with her family.  She reportedly had some right-sided weakness that began about a week ago but did not mention it to family.  It is substantially worsened on the day of admission 10/15 and the patient came in.  Has minimally improved since admission she is being followed by neurology for TIA.  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  Patient states she has been aware of the aneurysm for 5 years and declined surgical intervention in the past.  She denies any chronic or acute sudden headaches.  Has chronic macular degeneration of the right eye but no other visual changes.  Denies headaches.  Typically ambulates with walker.  She does have family history of aneurysm in her mother and uncle.    Review of Systems  Review of Systems   HENT: Negative for trouble swallowing.    Eyes: Positive for visual disturbance ( Chronic macular degeneration right eye).   Musculoskeletal: Positive for gait problem.   Neurological: Positive for speech difficulty ( Chronic mild) and weakness. Negative for dizziness, numbness and headaches.   Psychiatric/Behavioral: Negative for confusion.       History  PAST MEDICAL HISTORY  Past Medical History:   Diagnosis Date   • Anemia    • Aneurysm (HCC)     brain aneurysm, unruptured   • Arthritis    • Back pain    • Elevated cholesterol    • Hypertension    • Intestinal gangrene (HCC)    • Loose stools    • Right sided weakness    • Stroke (HCC)        PAST SURGICAL HISTORY  Past Surgical  History:   Procedure Laterality Date   • APPENDECTOMY     • CHOLECYSTECTOMY     • HIP SURGERY Right    • HYSTERECTOMY         FAMILY HISTORY  History reviewed. No pertinent family history.    SOCIAL HISTORY  Social History     Tobacco Use   • Smoking status: Never Smoker   • Smokeless tobacco: Never Used   Vaping Use   • Vaping Use: Not on file   Substance Use Topics   • Alcohol use: Not Currently   • Drug use: Never       retired  Recently left [facility] to live with family    Allergies:  Ether    MEDICATIONS:  Medications Prior to Admission   Medication Sig Dispense Refill Last Dose   • amLODIPine-atorvastatin (CADUET) 10-10 MG per tablet Take 1 tablet by mouth Daily.   10/15/2021 at Unknown time   • Aspirin Buf,CaCarb-MgCarb-MgO, 81 MG tablet Take 81 mg by mouth Daily.   10/15/2021 at Unknown time   • clopidogrel (Plavix) 75 MG tablet Take 75 mg by mouth Daily.   10/15/2021 at Unknown time   • gabapentin (NEURONTIN) 100 MG capsule Take 200 mg by mouth 3 (Three) Times a Day.   10/15/2021 at Unknown time   • levETIRAcetam (KEPPRA) 250 MG tablet Take 250 mg by mouth 2 (Two) Times a Day.   10/15/2021 at Unknown time   • Metoprolol Succinate 50 MG capsule extended-release 24 hour sprinkle Take 50 mg by mouth.   10/15/2021 at Unknown time   • multivitamin (THERAGRAN) tablet tablet Take 1 tablet by mouth Daily.   10/15/2021 at Unknown time   • POTASSIUM CHLORIDE ER PO Take 20 mEq by mouth Daily.   10/15/2021 at Unknown time   • rosuvastatin (CRESTOR) 5 MG tablet Take 5 mg by mouth Every Night.      • vitamin B-12 (CYANOCOBALAMIN) 1000 MCG tablet Take 1,000 mcg by mouth Daily.   10/15/2021 at Unknown time   • vitamin D3 125 MCG (5000 UT) capsule capsule Take 2,000 Units by mouth Daily.   10/15/2021 at Unknown time       Objective     Results Review:  LABS:  Results from last 7 days   Lab Units 10/18/21  0906 10/17/21  0705 10/16/21  0720   WBC 10*3/mm3 7.18 7.40 7.03   HEMOGLOBIN g/dL 12.5 12.5 12.3   HEMATOCRIT % 39.6  39.1 38.4   PLATELETS 10*3/mm3 357 341 369     Results from last 7 days   Lab Units 10/18/21  0906 10/17/21  0705 10/16/21  0720 10/15/21  1402 10/15/21  1402   SODIUM mmol/L 136 141 141   < > 138   POTASSIUM mmol/L 4.0 4.0 4.0   < > 4.0   CHLORIDE mmol/L 99 105 105   < > 101   CO2 mmol/L 25.1 25.6 25.9   < > 25.0   BUN mg/dL 17 14 11   < > 13   CREATININE mg/dL 0.96 0.88 0.85   < > 0.92   CALCIUM mg/dL 8.7 8.5 8.7   < > 9.5   BILIRUBIN mg/dL  --  0.3 0.5  --  0.3   ALK PHOS U/L  --  60 63  --  73   ALT (SGPT) U/L  --  11 11  --  15   AST (SGOT) U/L  --  14 16  --  17   GLUCOSE mg/dL 104* 92 91   < > 111*    < > = values in this interval not displayed.     Results from last 7 days   Lab Units 10/15/21  1402   INR  0.92     DIAGNOSTICS:  MRI brain- no acute infarct. Chronic left BIJAN territory infarct. Abnormal flow void right CPA in area of RVA 14 x9 mm    CTA 2019 by report- 14 x 10 mm RVA/PICA aneurysm    CTA 2018 by report- 11 x 11 mm RVA/PICA aneurysm    Results Review:   I reviewed the patient's new clinical results.  I personally viewed and interpreted the patient's MRI brain.     Vital Signs   Temp:  [97.4 °F (36.3 °C)-97.9 °F (36.6 °C)] 97.9 °F (36.6 °C)  Heart Rate:  [64-93] 93  Resp:  [16] 16  BP: (124-135)/(46-80) 133/51    Physical Exam:  Physical Exam  Vitals reviewed.   Constitutional:       Appearance: Normal appearance.      Comments: Elderly but younger appearing than stated age female no acute distress.  Pleasant and cooperative.   Eyes:      Extraocular Movements: EOM normal.      Pupils: Pupils are equal, round, and reactive to light.   Pulmonary:      Effort: Pulmonary effort is normal.   Skin:     General: Skin is warm and dry.   Neurological:      Mental Status: She is alert and oriented to person, place, and time.      Coordination: Finger-Nose-Finger Test normal.   Psychiatric:         Mood and Affect: Mood normal.         Speech: Speech normal.         Thought Content: Thought content  normal.       Neurologic Exam     Mental Status   Oriented to person, place, and time.   Follows 3 step commands.   Attention: normal. Concentration: normal.   Speech: speech is normal   Level of consciousness: alert  Knowledge: good.   Normal comprehension.     Cranial Nerves     CN III, IV, VI   Pupils are equal, round, and reactive to light.  Extraocular motions are normal.   Right pupil: Size: 3 mm. Shape: regular.   Left pupil: Size: 3 mm. Shape: regular.   CN III: no CN III palsy  CN VI: no CN VI palsy  Nystagmus: right (Fatigues quickly)   Nystagmus type: rapid and horizontal    CN V   Facial sensation intact.     CN VII   Facial expression full, symmetric.     CN XI   CN XI normal.     CN XII   CN XII normal.     Motor Exam   Muscle bulk: normal  Overall muscle tone: normal  Right arm pronator drift: absent  Left arm pronator drift: absent    Strength   Strength 5/5 except as noted.   Weakness right lower extremity, distal greater than proximal     Sensory Exam   Light touch normal.     Gait, Coordination, and Reflexes     Gait  Gait: (Not tested.  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  Spoke with the patient at bedside and her daughter via telephone with patient's permission.  Her daughter states that she was barely able to nod her head yes and no or use her right side following her stroke 5 years ago stroke.  She worked her way back up to independent living 20 hours a day with only some mild assistance from her daughter with ADLs.  At the time of her stroke there was an incidental finding of a right vertebral artery/PICA aneurysm.  They did see  "neurosurgery, Dr. Reid at that time.  Patient and daughter state that they discussed surgical intervention but the patient declined surgery due to her age and risk.  Daughter states that the patient felt that since there was no guarantee of her returning back to 100% normal living, she would not want to take the surgical risk and would rather let nature take its course the aneurysm was ruptured.  MRI currently reports aneurysm at 14 x 9 mm which is similar to CTA 2019.  There was a slight increase in the size by report from 2018.  She has no red flags on exam and has no symptoms referable to the aneurysm including dizziness, diplopia, or headaches.  Advised patient that I would speak with Dr. Elder regarding additional imaging or work-up or whether surgical intervention would be advised.  I asked the patient if  surgical intervention was recommended, would she want it and she specifically declined.  She states that she is not interested in surgical intervention for the aneurysm.  I discussed with her daughter who also states that she is in agreement with her mother's wishes.  With her advanced age and surgical risk I concur with the patient's continue plan of surgical avoidance.  We will sign off.  If there are further questions, patient can return to our service or her prior neurosurgeon Dr. Reid for discussion.    PLAN:   No neurosurgical plans as patient is not interested in surgical intervention    I discussed the patient's findings and my recommendations with patient, family, nursing staff and Dr. Jael Bautista, APRN  10/18/21  13:26 EDT    \"Dictated utilizing Dragon dictation\".      " 1/week(s)

## 2021-10-18 NOTE — PLAN OF CARE
Goal Outcome Evaluation:  Plan of Care Reviewed With: patient        Progress: improving  Outcome Summary: Pt tolerates OT treatment well. Pt able to complete stand pivot transfer from EOB <> BSC w/ min A. Pt requires mod A for toileting tasks- pt able to complete toileting hygiene from seated position but requires assist for clothing mgnt before and after hygiene. Pt requires total A for LB dressing but states she required assist w/ this at PLOF. Pt completes grooming w/ s/u. Pt tolerates UB thera ex well. Pt motivated and hopeful to discharge to inpatient rehab. Will continue to progress as tolerated. OT wore standard mask, gloves, glasses, and performed thorough hand hygiene before and after session.

## 2021-10-18 NOTE — DISCHARGE PLACEMENT REQUEST
"Dionne Chavez (89 y.o. Female)             Date of Birth Social Security Number Address Home Phone MRN    08/18/1931  [address] [place] [address] [phone] [id number]    [employer] Marital Status             Religious        Admission Date Admission Type Admitting Provider Attending Provider Department, Room/Bed    10/15/21 Emergency Garry Childers MD Benton, John B, MD [address], [address]1    Discharge Date Discharge Disposition Discharge Destination                         Attending Provider: Jere Floyd MD    Allergies: Ether    Isolation: None   Infection: None   Code Status: CPR   Advance Care Planning Activity    Ht: 160 cm (63\")   Wt: 70.8 kg (156 lb)    Admission Cmt: None   Principal Problem: Cerebrovascular 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 artery (HCC) [I63.81]                 Active Insurance as of 10/15/2021     Primary Coverage     Payor Plan Insurance Group Employer/Plan Group    MEDICARE MEDICARE A & B      Payor Plan Address Payor Plan Phone Number Payor Plan Fax Number Effective Dates    [address] [phone]  8/1/1996 - None Entered    [facility] [id number]       Subscriber Name Subscriber Birth Date Member ID       DIONNE CHAVEZ 8/18/1931 [id number]           Secondary Coverage     Payor Plan Insurance Group Employer/Plan Group    ANTHEM BLUE CROSS [facility] SUPP [id number]     Payor Plan Address Payor Plan Phone Number Payor Plan Fax Number Effective Dates    [address]   12/1/2016 - None Entered    [facility] [id number]       Subscriber Name Subscriber Birth Date Member ID       DIONNE CHAVEZ 8/18/1931 [id number]                 Emergency Contacts      (Rel.) Home Phone Work Phone Mobile Phone    JUANITA CARSON (Daughter) -- -- [phone]              "

## 2021-10-18 NOTE — CONSULTS
Acute rehab referral received via stroke order set. Please note that the acute rehab admission RNs will not be actively evaluating this patient. If it is felt that the patient is or will be acute rehab appropriate please call the admissions office at [phone] and a full evaluation will be initiated.    Thank you!    Gibson Gibson RN  p   f

## 2021-10-18 NOTE — PROGRESS NOTES
Name: Dionne Chavez ADMIT: 10/15/2021   : 1931  PCP: Chantelle Rodriguez APRN    MRN: [id number] LOS: 3 days   AGE/SEX: 89 y.o. female  ROOM: [facility]     Subjective   Subjective   Feeling well today. Right-sided weakness slightly improved. No HA or visual changes. Tolerating po. Voiding well. +BM. No SOA or CP or palp.    Review of Systems   Constitutional: Negative for appetite change, chills, diaphoresis, fatigue and fever.   HENT: Negative for congestion, nosebleeds, sore throat, trouble swallowing and voice change.    Eyes: Negative for pain and visual disturbance.   Respiratory: Negative for cough, choking, chest tightness and shortness of breath.    Cardiovascular: Negative for chest pain, palpitations and leg swelling.   Gastrointestinal: Negative for abdominal pain, blood in stool, diarrhea, nausea and vomiting.   Endocrine: Negative for cold intolerance and heat intolerance.   Genitourinary: Negative for decreased urine volume, difficulty urinating, dysuria and hematuria.   Musculoskeletal: Negative for back pain and neck pain.   Skin: Negative for color change, pallor and rash.   Allergic/Immunologic: Negative for environmental allergies and food allergies.   Neurological: Positive for facial asymmetry and weakness (right sided). Negative for tremors, seizures, syncope, speech difficulty and headaches.   Hematological: Negative for adenopathy. Does not bruise/bleed easily.   Psychiatric/Behavioral: Negative for behavioral problems, confusion and hallucinations.        Objective   Objective   Vital Signs  Temp:  [97.4 °F (36.3 °C)-97.9 °F (36.6 °C)] 97.4 °F (36.3 °C)  Heart Rate:  [64-93] 93  Resp:  [16] 16  BP: (124-135)/(46-80) 131/80  SpO2:  [93 %-99 %] 93 %  on  Flow (L/min):  [2] 2;   Device (Oxygen Therapy): room air  Body mass index is 27.63 kg/m².  Physical Exam  Vitals and nursing note reviewed. Exam conducted with a chaperone present.   Constitutional:       General: She is not in acute  distress.     Appearance: She is not ill-appearing, toxic-appearing or diaphoretic.   HENT:      Head: Normocephalic and atraumatic.      Right Ear: External ear normal.      Left Ear: External ear normal.      Nose: Nose normal.      Mouth/Throat:      Mouth: Mucous membranes are moist.      Pharynx: Oropharynx is clear.   Eyes:      General: No scleral icterus.        Right eye: No discharge.         Left eye: No discharge.      Extraocular Movements: Extraocular movements intact.      Conjunctiva/sclera: Conjunctivae normal.   Cardiovascular:      Rate and Rhythm: Normal rate and regular rhythm.      Pulses: Normal pulses.      Heart sounds: Normal heart sounds.   Pulmonary:      Effort: Pulmonary effort is normal. No respiratory distress.      Breath sounds: Normal breath sounds.   Abdominal:      General: Bowel sounds are normal. There is no distension.      Palpations: Abdomen is soft.      Tenderness: There is no abdominal tenderness.   Musculoskeletal:         General: Swelling (trace edema in BLEs, chronic) present. Normal range of motion.      Cervical back: Neck supple. No rigidity.   Lymphadenopathy:      Cervical: No cervical adenopathy.   Skin:     General: Skin is warm and dry.      Capillary Refill: Capillary refill takes less than 2 seconds.      Coloration: Skin is not jaundiced.      Findings: No rash.   Neurological:      Mental Status: She is alert and oriented to person, place, and time.      Cranial Nerves: No cranial nerve deficit.      Comments: Right sided weakness in UE and LE  Very mild right facial weakness   Psychiatric:         Mood and Affect: Mood normal.         Behavior: Behavior normal.         Thought Content: Thought content normal.      Comments: Very pleasant as always         Results Review     I reviewed the patient's new clinical results.  Results from last 7 days   Lab Units 10/18/21  0906 10/17/21  0705 10/16/21  0720 10/15/21  1402   WBC 10*3/mm3 7.18 7.40 7.03 8.29    HEMOGLOBIN g/dL 12.5 12.5 12.3 12.8   PLATELETS 10*3/mm3 357 341 369 352     Results from last 7 days   Lab Units 10/18/21  0906 10/17/21  0705 10/16/21  0720 10/15/21  1402   SODIUM mmol/L 136 141 141 138   POTASSIUM mmol/L 4.0 4.0 4.0 4.0   CHLORIDE mmol/L 99 105 105 101   CO2 mmol/L 25.1 25.6 25.9 25.0   BUN mg/dL 17 14 11 13   CREATININE mg/dL 0.96 0.88 0.85 0.92   GLUCOSE mg/dL 104* 92 91 111*   Estimated Creatinine Clearance: 36.8 mL/min (by C-G formula based on SCr of 0.96 mg/dL).  Results from last 7 days   Lab Units 10/17/21  0705 10/16/21  0720 10/15/21  1402   ALBUMIN g/dL 3.90 4.30 4.80   BILIRUBIN mg/dL 0.3 0.5 0.3   ALK PHOS U/L 60 63 73   AST (SGOT) U/L 14 16 17   ALT (SGPT) U/L 11 11 15     Results from last 7 days   Lab Units 10/18/21  0906 10/17/21  0705 10/16/21  0720 10/15/21  1402   CALCIUM mg/dL 8.7 8.5 8.7 9.5   ALBUMIN g/dL  --  3.90 4.30 4.80   MAGNESIUM mg/dL 2.2 2.2  --   --        COVID19   Date Value Ref Range Status   10/15/2021 Not Detected Not Detected - Ref. Range Final     Hemoglobin A1C   Date/Time Value Ref Range Status   10/16/2021 0720 5.10 4.80 - 5.60 % Final     Glucose   Date/Time Value Ref Range Status   10/16/2021 0610 98 70 - 130 mg/dL Final     Comment:     Meter: [id number] : [id number] MATOS   10/15/2021 2039 110 70 - 130 mg/dL Final     Comment:     Meter: [id number] : [id number] MATOS       Duplex Carotid Ultrasound CAR  · Right internal carotid artery is normal.  · Left internal carotid artery plaque without significant stenosis.     MRI Brain Without Contrast  Narrative: BRAIN MRI WITHOUT CONTRAST     HISTORY: Evaluate for stroke. Right-sided weakness. Possible hemorrhage.     TECHNIQUE: Noncontrast brain MRI is correlated with head CT 10/15/2021.     FINDINGS: The ventricles are normal in caliber. There is no restricted  diffusion to suggest the presence of any acute stroke. Extensive  increased T2 signal is observed in the  periventricular white matter and  there is encephalomalacia along the para midline posterosuperior left  frontal lobe measuring about 5 cm AP and 17 mm transverse. There is a 14  x 9 mm diameter abnormal flow void contiguous with the upper portion of  the right vertebral artery and corresponding to the abnormality seen on  the head CT. This almost certainly represents an aneurysm. The right  middle cranial fossa extra-axial lesion that was demonstrated on the CT  is not well demonstrated on this exam. The addition of contrast might  make this more apparent. The extra-axial spaces appear normal. A 4.5 cm  cystic lesion in the left occipital scalp is noted as well. Paranasal  sinuses are clear and the orbital structures appear normal.     Impression: 1. No evidence of acute infarction.  2. 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  3. 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 This measures  about 14 x 9 mm diameter.  4. Partially calcified extra-axial lesion seen in the right middle  cranial fossa inferolaterally on CT is not well demonstrated on this  noncontrast brain MRI.     This report was finalized on 10/17/2021 1:50 PM by Dr. Jere Colmenares M.D.       Scheduled Medications  amLODIPine, 10 mg, Oral, Nightly   And  atorvastatin, 10 mg, Oral, Nightly  aspirin, 81 mg, Oral, Daily  clopidogrel, 75 mg, Oral, Daily  gabapentin, 200 mg, Oral, TID  influenza vaccine, 0.5 mL, Intramuscular, Once  levETIRAcetam, 250 mg, Oral, Q12H  metoprolol succinate XL, 50 mg, Oral, Q24H  multivitamin, 1 tablet, Oral, Daily  potassium chloride, 10 mEq, Oral, Daily  sodium chloride, 10 mL, Intravenous, Q12H    Infusions   Diet  Diet Regular; Cardiac       Assessment/Plan     Active Hospital Problems    Diagnosis  POA   • **Cerebrovascular 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 artery (HCC) [I63.81]  Yes   •  Seizure disorder (HCC) [G40.909]  Yes   • Aneurysm (HCC) [I72.9]  Yes   • Hyperlipidemia [E78.5]  Yes   • History of CVA (cerebrovascular accident) [Z86.73]  Not Applicable   • HTN (hypertension) [I10]  Yes      Resolved Hospital Problems   No resolved problems to display.       Very pleasant 89yo woman with h/o left-sided CVA with residual right hemiparesis about 5 years ago, who was living in SNF until about a week ago when she moved back into her own home with family. Last week she began to suffer some worsening of her right-sided weakness, but did not tell anyone. She woke from sleep yesterday with her right-sided weakness much worse and then presented to ER.    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  · Full code.  · Discussed with patient, RN, and CCP at [facility] this AM.  · Anticipate discharge to [facility] vs Acute Rehab?      Jere Floyd MD  [facility]  10/18/21  11:32 EDT

## 2021-10-18 NOTE — PLAN OF CARE
Goal Outcome Evaluation:  Plan of Care Reviewed With: patient           Outcome Summary: Pt was agreeable to therapy. She required mod A to transfer to EOB, but stood and ambulated with min A using Rwx and AFO donned on R foot.

## 2021-10-19 VITALS
WEIGHT: 156 LBS | DIASTOLIC BLOOD PRESSURE: 55 MMHG | HEART RATE: 69 BPM | TEMPERATURE: 97.8 F | HEIGHT: 63 IN | OXYGEN SATURATION: 95 % | RESPIRATION RATE: 14 BRPM | SYSTOLIC BLOOD PRESSURE: 129 MMHG | BODY MASS INDEX: 27.64 KG/M2

## 2021-10-19 PROBLEM — G45.9 TRANSIENT ISCHEMIC ATTACK (TIA): Status: ACTIVE | Noted: 2021-10-19

## 2021-10-19 RX ORDER — GABAPENTIN 100 MG/1
200 CAPSULE ORAL 3 TIMES DAILY
Qty: 20 CAPSULE | Refills: 0 | Status: SHIPPED | OUTPATIENT
Start: 2021-10-19

## 2021-10-19 RX ORDER — AMLODIPINE BESYLATE 10 MG/1
10 TABLET ORAL NIGHTLY
Start: 2021-10-19

## 2021-10-19 RX ADMIN — POTASSIUM CHLORIDE 10 MEQ: 750 TABLET, EXTENDED RELEASE ORAL at 08:54

## 2021-10-19 RX ADMIN — METOPROLOL SUCCINATE 50 MG: 50 TABLET, EXTENDED RELEASE ORAL at 08:55

## 2021-10-19 RX ADMIN — GABAPENTIN 200 MG: 100 CAPSULE ORAL at 08:55

## 2021-10-19 RX ADMIN — Medication 1 TABLET: at 08:55

## 2021-10-19 RX ADMIN — CLOPIDOGREL 75 MG: 75 TABLET, FILM COATED ORAL at 08:55

## 2021-10-19 RX ADMIN — LEVETIRACETAM 250 MG: 250 TABLET, FILM COATED ORAL at 08:55

## 2021-10-19 RX ADMIN — ASPIRIN 81 MG: 81 TABLET, COATED ORAL at 08:58

## 2021-10-19 RX ADMIN — SODIUM CHLORIDE, PRESERVATIVE FREE 10 ML: 5 INJECTION INTRAVENOUS at 08:55

## 2021-10-19 NOTE — PROGRESS NOTES
Name: Dionne Chavez ADMIT: 10/15/2021   : 1931  PCP: Chantelle Rodriguez APRN    MRN: [id number] LOS: 4 days   AGE/SEX: 89 y.o. female  ROOM: [facility]     Subjective   Subjective   Feeling well today. Right-sided weakness slightly improved again today but still not back to her baseline. No HA or visual changes. Tolerating po. Voiding well. +BM. No SOA or CP or palp.    Review of Systems   Constitutional: Negative for appetite change, chills, diaphoresis, fatigue and fever.   HENT: Negative for congestion, nosebleeds, sore throat, trouble swallowing and voice change.    Eyes: Negative for pain and visual disturbance.   Respiratory: Negative for cough, choking, chest tightness and shortness of breath.    Cardiovascular: Negative for chest pain, palpitations and leg swelling.   Gastrointestinal: Negative for abdominal pain, blood in stool, diarrhea, nausea and vomiting.   Endocrine: Negative for cold intolerance and heat intolerance.   Genitourinary: Negative for decreased urine volume, difficulty urinating, dysuria and hematuria.   Musculoskeletal: Negative for back pain and neck pain.   Skin: Negative for color change, pallor and rash.   Allergic/Immunologic: Negative for environmental allergies and food allergies.   Neurological: Positive for facial asymmetry and weakness (right sided). Negative for tremors, seizures, syncope, speech difficulty and headaches.   Hematological: Negative for adenopathy. Does not bruise/bleed easily.   Psychiatric/Behavioral: Negative for behavioral problems, confusion and hallucinations.        Objective   Objective   Vital Signs  Temp:  [97.6 °F (36.4 °C)-98.6 °F (37 °C)] 97.6 °F (36.4 °C)  Heart Rate:  [81-86] 86  Resp:  [16] 16  BP: (116-133)/(51-99) 123/60  SpO2:  [93 %-95 %] 93 %  on  Flow (L/min):  [2] 2;   Device (Oxygen Therapy): nasal cannula  Body mass index is 27.63 kg/m².  Physical Exam  Vitals and nursing note reviewed.   Constitutional:       General: She is not in  acute distress.     Appearance: She is not ill-appearing, toxic-appearing or diaphoretic.   HENT:      Head: Normocephalic and atraumatic.      Right Ear: External ear normal.      Left Ear: External ear normal.      Nose: Nose normal.      Mouth/Throat:      Mouth: Mucous membranes are moist.      Pharynx: Oropharynx is clear.   Eyes:      General: No scleral icterus.        Right eye: No discharge.         Left eye: No discharge.      Extraocular Movements: Extraocular movements intact.      Conjunctiva/sclera: Conjunctivae normal.   Cardiovascular:      Rate and Rhythm: Normal rate and regular rhythm.      Pulses: Normal pulses.      Heart sounds: Normal heart sounds.   Pulmonary:      Effort: Pulmonary effort is normal. No respiratory distress.      Breath sounds: Normal breath sounds.   Abdominal:      General: Bowel sounds are normal. There is no distension.      Palpations: Abdomen is soft.      Tenderness: There is no abdominal tenderness.   Musculoskeletal:         General: Swelling (trace edema in BLEs, chronic) present. Normal range of motion.      Cervical back: Neck supple. No rigidity.   Lymphadenopathy:      Cervical: No cervical adenopathy.   Skin:     General: Skin is warm and dry.      Capillary Refill: Capillary refill takes less than 2 seconds.      Coloration: Skin is not jaundiced.      Findings: No rash.   Neurological:      Mental Status: She is alert and oriented to person, place, and time.      Cranial Nerves: No cranial nerve deficit.      Comments: Right sided weakness in UE and LE  Very mild right facial weakness   Psychiatric:         Mood and Affect: Mood normal.         Behavior: Behavior normal.         Thought Content: Thought content normal.      Comments: Very pleasant as always         Results Review     I reviewed the patient's new clinical results.  Results from last 7 days   Lab Units 10/18/21  0906 10/17/21  0705 10/16/21  0720 10/15/21  1402   WBC 10*3/mm3 7.18 7.40 7.03  8.29   HEMOGLOBIN g/dL 12.5 12.5 12.3 12.8   PLATELETS 10*3/mm3 357 341 369 352     Results from last 7 days   Lab Units 10/18/21  0906 10/17/21  0705 10/16/21  0720 10/15/21  1402   SODIUM mmol/L 136 141 141 138   POTASSIUM mmol/L 4.0 4.0 4.0 4.0   CHLORIDE mmol/L 99 105 105 101   CO2 mmol/L 25.1 25.6 25.9 25.0   BUN mg/dL 17 14 11 13   CREATININE mg/dL 0.96 0.88 0.85 0.92   GLUCOSE mg/dL 104* 92 91 111*   Estimated Creatinine Clearance: 36.8 mL/min (by C-G formula based on SCr of 0.96 mg/dL).  Results from last 7 days   Lab Units 10/17/21  0705 10/16/21  0720 10/15/21  1402   ALBUMIN g/dL 3.90 4.30 4.80   BILIRUBIN mg/dL 0.3 0.5 0.3   ALK PHOS U/L 60 63 73   AST (SGOT) U/L 14 16 17   ALT (SGPT) U/L 11 11 15     Results from last 7 days   Lab Units 10/18/21  0906 10/17/21  0705 10/16/21  0720 10/15/21  1402   CALCIUM mg/dL 8.7 8.5 8.7 9.5   ALBUMIN g/dL  --  3.90 4.30 4.80   MAGNESIUM mg/dL 2.2 2.2  --   --        COVID19   Date Value Ref Range Status   10/15/2021 Not Detected Not Detected - Ref. Range Final     No results found for: HGBA1C, POCGLU    Duplex Carotid Ultrasound CAR  · Right internal carotid artery is normal.  · Left internal carotid artery plaque without significant stenosis.     MRI Brain Without Contrast  Narrative: BRAIN MRI WITHOUT CONTRAST     HISTORY: Evaluate for stroke. Right-sided weakness. Possible hemorrhage.     TECHNIQUE: Noncontrast brain MRI is correlated with head CT 10/15/2021.     FINDINGS: The ventricles are normal in caliber. There is no restricted  diffusion to suggest the presence of any acute stroke. Extensive  increased T2 signal is observed in the periventricular white matter and  there is encephalomalacia along the para midline posterosuperior left  frontal lobe measuring about 5 cm AP and 17 mm transverse. There is a 14  x 9 mm diameter abnormal flow void contiguous with the upper portion of  the right vertebral artery and corresponding to the abnormality seen on  the head  CT. This almost certainly represents an aneurysm. The right  middle cranial fossa extra-axial lesion that was demonstrated on the CT  is not well demonstrated on this exam. The addition of contrast might  make this more apparent. The extra-axial spaces appear normal. A 4.5 cm  cystic lesion in the left occipital scalp is noted as well. Paranasal  sinuses are clear and the orbital structures appear normal.     Impression: 1. No evidence of acute infarction.  2. 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  3. 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 This measures  about 14 x 9 mm diameter.  4. Partially calcified extra-axial lesion seen in the right middle  cranial fossa inferolaterally on CT is not well demonstrated on this  noncontrast brain MRI.     This report was finalized on 10/17/2021 1:50 PM by Dr. Jere Colmenares M.D.       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 Last week she began to suffer some worsening of her right-sided weakness, but did not tell anyone. She then woke from sleep day of admission with her right-sided weakness much worse and presented to ER.    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  · Full code.  · Discussed with patient, RN, and CCP at [facility] this AM.  · Anticipate discharge to [facility] vs Acute Rehab? I think she would do better in acute setting, pt agrees.      Jere Floyd MD  [facility]  10/19/21  10:59 EDT

## 2021-10-19 NOTE — DISCHARGE PLACEMENT REQUEST
"Dionne Chavez (89 y.o. Female)             Date of Birth Social Security Number Address Home Phone MRN    [id number]  [id number] [facility] [address] [phone] [id number]    Religious Marital Status             [facility]        Admission Date Admission Type Admitting Provider Attending Provider Department, Room/Bed    10/15/21 Emergency Garry Childers MD Benton, John B, MD [address], [address]/    Discharge Date Discharge Disposition Discharge Destination           Skilled Nursing Facility (DC - External)              Attending Provider: Jere Floyd MD    Allergies: Ether    Isolation: None   Infection: None   Code Status: CPR   Advance Care Planning Activity    Ht: 160 cm (63\")   Wt: 70.8 kg (156 lb)    Admission Cmt: None   Principal Problem: Transient ischemic attack (TIA) [G45.9]                 Active Insurance as of 10/15/2021     Primary Coverage     Payor Plan Insurance Group Employer/Plan Group    MEDICARE MEDICARE A & B      Payor Plan Address Payor Plan Phone Number Payor Plan Fax Number Effective Dates    [address] [phone]  1996 - None Entered    [facility] [id number]       Subscriber Name Subscriber Birth Date Member ID       DIONNE CHAVEZ 1931 [id number]           Secondary Coverage     Payor Plan Insurance Group Employer/Plan Group    ANTH BLUE CROSS [facility] SUPP [id number]     Payor Plan Address Payor Plan Phone Number Payor Plan Fax Number Effective Dates    [address]   2016 - None Entered    [facility] [id number]       Subscriber Name Subscriber Birth Date Member ID       DIONNE CHAVEZ 1931 [id number]                 Emergency Contacts      (Rel.) Home Phone Work Phone Mobile Phone    JUANITA CARSON (Daughter) -- -- [phone]                 Discharge Summary      Jere Floyd MD at 10/19/21 1205              Patient Name: Dionne Chavez  : 1931  MRN: [id number]    Date of Admission: 10/15/2021  Date of " Discharge:  10/19/2021  Primary Care Physician: Chantelle Rodriguez APRN      Chief Complaint:   Extremity Weakness      Discharge Diagnoses     Active Hospital Problems    Diagnosis  POA   • **Transient ischemic attack (TIA) [G45.9]  Yes   • Seizure disorder (HCC) [G40.909]  Yes   • Aneurysm (HCC) [I72.9]  Yes   • Hyperlipidemia [E78.5]  Yes   • History of CVA (cerebrovascular accident) [Z86.73]  Not Applicable   • HTN (hypertension) [I10]  Yes      Resolved Hospital Problems   No resolved problems to display.        Hospital Course     Very pleasant 89yo woman with h/o left-sided CVA with residual right hemiparesis about 5 years ago, who was living in SNF until about a week ago when she moved back into her own home with family. Last week she began to suffer some worsening of her right-sided weakness, but did not tell anyone. She then woke from sleep day of admission with her right-sided weakness much worse and presented to ER. 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    Day of Discharge     Subjective:  Feeling well today. Right-sided weakness slightly improved again today but still not back to her baseline. No HA or visual changes.  Tolerating po. Voiding well. +BM. No SOA or CP or palp.    Physical Exam:  Temp:  [97.6 °F (36.4 °C)-98.6 °F (37 °C)] 97.6 °F (36.4 °C)  Heart Rate:  [81-86] 86  Resp:  [16] 16  BP: (116-133)/(51-99) 123/60  Body mass index is 27.63 kg/m².  Physical Exam  Vitals and nursing note reviewed.   Constitutional:       General: She is not in acute distress.     Appearance: She is not ill-appearing, toxic-appearing or diaphoretic.   HENT:      Head: Normocephalic and atraumatic.      Right Ear: External ear normal.      Left Ear: External ear normal.      Nose: Nose normal.      Mouth/Throat:      Mouth: Mucous membranes are moist.      Pharynx: Oropharynx is clear.   Eyes:      General: No scleral icterus.        Right eye: No discharge.         Left eye: No discharge.      Extraocular Movements: Extraocular movements intact.      Conjunctiva/sclera: Conjunctivae normal.   Cardiovascular:      Rate and Rhythm: Normal rate and regular rhythm.      Pulses: Normal pulses.      Heart sounds: Normal heart sounds.   Pulmonary:      Effort: Pulmonary effort is normal. No respiratory distress.      Breath sounds: Normal breath sounds.   Abdominal:      General: Bowel sounds are normal. There is no distension.      Palpations: Abdomen is soft.      Tenderness: There is no abdominal tenderness.   Musculoskeletal:         General: Swelling (trace edema in BLEs, chronic) present. Normal range of motion.      Cervical back: Neck supple. No rigidity.   Lymphadenopathy:      Cervical: No cervical adenopathy.   Skin:     General: Skin is warm and dry.      Capillary Refill: Capillary refill takes less than 2 seconds.      Coloration: Skin is not jaundiced.      Findings: No rash.   Neurological:      Mental Status: She is alert and oriented to person, place, and time.      Cranial Nerves: No cranial nerve deficit.      Comments: Right sided weakness in UE and LE  Very mild right facial weakness   Psychiatric:         Mood and Affect: Mood  normal.         Behavior: Behavior normal.         Thought Content: Thought content normal.      Comments: Very pleasant as always            Consultants     Consult Orders (all) (From admission, onward)     Start     Ordered    10/18/21 1131  Inpatient Rehab Admission Consult  Once        Provider:  (Not yet assigned)    10/18/21 1131    10/16/21 1504  Inpatient Neurosurgery Consult  Once        Specialty:  Neurosurgery  Provider:  Lg Bill MD    10/16/21 1504    10/16/21 0407  Inpatient Advance Care Planning Consult  Once        Comments: Patient and family a DNR advanced directive   Provider:  (Not yet assigned)    10/16/21 0406    10/16/21 0405  Inpatient Advance Care Planning Consult  Once,   Status:  Canceled        Comments: Patient and family wants to be formulate a DNR directive   Provider:  (Not yet assigned)    10/16/21 0405    10/15/21 2012  Notify Stroke Coordinator  Once        Provider:  (Not yet assigned)    10/15/21 2013    10/15/21 2012  Inpatient Rehab Admission Consult  Once        Provider:  (Not yet assigned)    10/15/21 2013    10/15/21 2012  Consult to Case Management   Once        Provider:  (Not yet assigned)    10/15/21 2013    10/15/21 2012  Consult to Diabetes Educator  Once,   Status:  Canceled        Provider:  (Not yet assigned)    10/15/21 2013    10/15/21 2012  Inpatient Neurology Consult Stroke  Once        Specialty:  Neurology  Provider:  Lnidy Vincent MD    10/15/21 2013    10/15/21 1610  [facility] (on-call MD unless specified) Details  Once        Specialty:  Hospitalist  Provider:  Garry Childers MD    10/15/21 1609              Procedures     * Surgery not found *      Imaging Results (All)     Procedure Component Value Units Date/Time    MRI Brain Without Contrast [[id number]] Collected: 10/17/21 1306     Updated: 10/17/21 1353    Narrative:      BRAIN MRI WITHOUT CONTRAST     HISTORY: Evaluate for stroke. Right-sided weakness. Possible hemorrhage.      TECHNIQUE: Noncontrast brain MRI is correlated with head CT 10/15/2021.     FINDINGS: The ventricles are normal in caliber. There is no restricted  diffusion to suggest the presence of any acute stroke. Extensive  increased T2 signal is observed in the periventricular white matter and  there is encephalomalacia along the para midline posterosuperior left  frontal lobe measuring about 5 cm AP and 17 mm transverse. There is a 14  x 9 mm diameter abnormal flow void contiguous with the upper portion of  the right vertebral artery and corresponding to the abnormality seen on  the head CT. This almost certainly represents an aneurysm. The right  middle cranial fossa extra-axial lesion that was demonstrated on the CT  is not well demonstrated on this exam. The addition of contrast might  make this more apparent. The extra-axial spaces appear normal. A 4.5 cm  cystic lesion in the left occipital scalp is noted as well. Paranasal  sinuses are clear and the orbital structures appear normal.       Impression:      1. No evidence of acute infarction.  2. 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  3. 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 This measures  about 14 x 9 mm diameter.  4. Partially calcified extra-axial lesion seen in the right middle  cranial fossa inferolaterally on CT is not well demonstrated on this  noncontrast brain MRI.     This report was finalized on 10/17/2021 1:50 PM by Dr. Jere Colmenares M.D.       CT Head Without Contrast [[id number]] Collected: 10/15/21 1609     Updated: 10/15/21 1630    Narrative:      CT HEAD WITHOUT CONTRAST     CLINICAL HISTORY: Acute onset right-sided weakness.     TECHNIQUE: CT scan of the head was obtained with 3 mm axial soft tissue  algorithm and 2 mm bone algorithm images. No intravenous contrast was  administered. Sagittal and coronal reconstructions  were obtained.     COMPARISON: No previous similar studies are available for comparison.     FINDINGS:       There is no evidence for a calvarial fracture. There is no evidence for  an acute extra-axial hemorrhage.     Within the inferior aspect of the right middle cranial fossa, there is a  partially calcified lesion that is likely extra-axial in nature and  measures up to approximately 2.7 x 1.4 x 1.5 cm in greatest dimensions  that is likely representative of a partially calcified meningioma.  Additionally, within the posterior fossa, anterior to the inferior  portion of the right cerebellar hemisphere and right lateral to the  pontomedullary junction, there is another extra-axial lesion which  measures up to approximately 1.7 x 1.0 cm in greatest axial dimensions.  This lesion is peripherally calcified and is within the immediate  vicinity of the right vertebral artery. I suspect that the findings are  representative of a large aneurysm arising from the right vertebral  artery, although another potential possibility would be a meningioma at  this site. I recommend further evaluation with either a CT angiogram or  MRI/MRA study.     The ventricles, sulci, and cisterns are age appropriate. There is a  focus of encephalomalacia within the medial portion of the left frontal  lobe which measures up to approximately 6.0 x 5.6 x 3.0 cm in greatest  dimensions that is compatible with a chronic infarct within the left BIJAN  distribution. There is an age-indeterminate lacune within the left  lentiform nucleus which measures up to approximately 9 mm in diameter.  There are mild-to-moderate changes of chronic small vessel ischemic  phenomena. A chronic lacune is identified within the left caudate body  extending to the adjacent corona radiata.     The extracranial structures are incidentally notable for a large  sebaceous cyst within the left occipital scalp which measures up to  approximately 1.7 x 4.3 cm in greatest axial  dimensions.       Impression:         There is an age-indeterminate lacune within the left lentiform nucleus.  Otherwise, there is no convincing evidence to suggest an acute  intracranial abnormality.     There are findings compatible with a chronic infarct within the medial  portion of the left frontal lobe within the left BIJAN distribution and  measures up to approximately 6.0 x 5.6 x 3.0 cm in greatest dimensions.  Old lacunar disease is seen within the left caudate extending into the  adjacent corona radiata.     There is a partially calcified mass within the inferior aspect of the  right middle cranial fossa that is likely extra-axial in nature and  likely representative of a partially calcified meningioma. Additionally,  within the posterior fossa just anterior to the inferior aspect of the  right cerebellar hemisphere along the right lateral aspect of the  pontomedullary junction, there is an extra-axial peripherally calcified  lesion that measures up to 1.7 x 1.0 cm in greatest axial dimensions.  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 Further evaluation could be obtained with either an MRA/MRA  study or a CT angiogram.     Incidental note is made of a large sebaceous cyst within the left  occipital scalp.           Radiation dose reduction techniques were utilized, including automated  exposure control and exposure modulation based on body size.     This report was finalized on 10/15/2021 4:27 PM by Dr. Alfredo Adame M.D.           Results for orders placed during the hospital encounter of 10/15/21    Duplex Carotid Ultrasound CAR    Interpretation Summary  · Right internal carotid artery is normal.  · Left internal carotid artery plaque without significant stenosis.    Results for orders placed during the hospital encounter  of 10/15/21    Adult transthoracic echo complete    Interpretation Summary  · Calculated left ventricular EF = 61.6% Estimated left ventricular EF was in agreement with the calculated left ventricular EF. Left ventricular systolic function is normal.  · Left ventricular diastolic function is consistent with (grade I) impaired relaxation.  · Trace tricuspid valve regurgitation is present.  · Estimated right ventricular systolic pressure from tricuspid regurgitation is normal (<35 mmHg).  · Saline test results are negative.    Pertinent Labs     Results from last 7 days   Lab Units 10/18/21  0906 10/17/21  0705 10/16/21  0720 10/15/21  1402   WBC 10*3/mm3 7.18 7.40 7.03 8.29   HEMOGLOBIN g/dL 12.5 12.5 12.3 12.8   PLATELETS 10*3/mm3 357 341 369 352     Results from last 7 days   Lab Units 10/18/21  0906 10/17/21  0705 10/16/21  0720 10/15/21  1402   SODIUM mmol/L 136 141 141 138   POTASSIUM mmol/L 4.0 4.0 4.0 4.0   CHLORIDE mmol/L 99 105 105 101   CO2 mmol/L 25.1 25.6 25.9 25.0   BUN mg/dL 17 14 11 13   CREATININE mg/dL 0.96 0.88 0.85 0.92   GLUCOSE mg/dL 104* 92 91 111*   Estimated Creatinine Clearance: 36.8 mL/min (by C-G formula based on SCr of 0.96 mg/dL).  Results from last 7 days   Lab Units 10/17/21  0705 10/16/21  0720 10/15/21  1402   ALBUMIN g/dL 3.90 4.30 4.80   BILIRUBIN mg/dL 0.3 0.5 0.3   ALK PHOS U/L 60 63 73   AST (SGOT) U/L 14 16 17   ALT (SGPT) U/L 11 11 15     Results from last 7 days   Lab Units 10/18/21  0906 10/17/21  0705 10/16/21  0720 10/15/21  1402   CALCIUM mg/dL 8.7 8.5 8.7 9.5   ALBUMIN g/dL  --  3.90 4.30 4.80   MAGNESIUM mg/dL 2.2 2.2  --   --        Results from last 7 days   Lab Units 10/15/21  1402   TROPONIN T ng/mL <0.010       Results from last 7 days   Lab Units 10/16/21  0720   CHOLESTEROL mg/dL 122   TRIGLYCERIDES mg/dL 95   HDL CHOL mg/dL 66*   LDL CHOL mg/dL 38         Results from last 7 days   Lab Units 10/15/21  1402   COVID19  Not Detected       Test Results Pending at  Discharge       Discharge Details        Discharge Medications      New Medications      Instructions Start Date   amLODIPine 10 MG tablet  Commonly known as: NORVASC   10 mg, Oral, Nightly         Continue These Medications      Instructions Start Date   Aspirin Buf(CaCarb-MgCarb-MgO) 81 MG tablet   81 mg, Oral, Daily      gabapentin 100 MG capsule  Commonly known as: NEURONTIN   200 mg, Oral, 3 Times Daily      levETIRAcetam 250 MG tablet  Commonly known as: KEPPRA   250 mg, Oral, 2 Times Daily      Metoprolol Succinate 50 MG capsule extended-release 24 hour sprinkle   Take 50 mg by mouth.      multivitamin tablet tablet   1 tablet, Oral, Daily      Plavix 75 MG tablet  Generic drug: clopidogrel   75 mg, Oral, Daily      POTASSIUM CHLORIDE ER PO   20 mEq, Oral, Daily      rosuvastatin 5 MG tablet  Commonly known as: CRESTOR   5 mg, Oral, Nightly      vitamin B-12 1000 MCG tablet  Commonly known as: CYANOCOBALAMIN   1,000 mcg, Oral, Daily      vitamin D3 125 MCG (5000 UT) capsule capsule   2,000 Units, Oral, Daily         Stop These Medications    amLODIPine-atorvastatin 10-10 MG per tablet  Commonly known as: CADUET            Allergies   Allergen Reactions   • Ether Other (See Comments)     Pass out       Discharge Disposition:  Skilled Nursing Facility (DC - External)      Discharge Diet:  Diet Order   Procedures   • Diet Regular; Cardiac       Discharge Activity:   WBAT, up with assistance only, further recs per PT at facility    CODE STATUS:    Code Status and Medical Interventions:   Ordered at: 10/15/21 2013     Code Status:    CPR     Medical Interventions (Level of Support Prior to Arrest):    Full       No future appointments.  Additional Instructions for the Follow-ups that You Need to Schedule     Discharge Follow-up with PCP   As directed       Currently Documented PCP:    Chantelle Rodriguez APRN    PCP Phone Number:    [phone]     Follow Up Details: Jennifer NP (PCP) in 2 weeks         Discharge  Follow-up with Specified Provider: [facility]; 3 Months   As directed      To: [facility]    Follow Up: 3 Months            Contact information for follow-up providers     Chantelle Rodriguez APRN .    Specialty: Nurse Practitioner  Why: Jennifer AKINS (PCP) in 2 weeks  Contact information:  [address]  [facility] [address]  [phone]                   Contact information for after-discharge care     Destination     [facility] .    Service: Skilled Nursing  Contact information:  [address] [facility] [address] [phone]                             Additional Instructions for the Follow-ups that You Need to Schedule     Discharge Follow-up with PCP   As directed       Currently Documented PCP:    Chantelle Rodriguez APRN    PCP Phone Number:    [phone]     Follow Up Details: Jennifer AKINS (PCP) in 2 weeks         Discharge Follow-up with Specified Provider: [facility]; 3 Months   As directed      To: [facility]    Follow Up: 3 Months           Time Spent on Discharge:  Greater than 30 minutes      Jeer Floyd MD  [facility]  10/19/21  12:05 EDT                Electronically signed by Jere Floyd MD at 10/19/21 1636

## 2021-10-19 NOTE — CASE MANAGEMENT/SOCIAL WORK
Continued Stay Note  [facility]     Patient Name: Dionne Chavez  MRN: [id number]  Today's Date: 10/19/2021    Admit Date: 10/15/2021     Discharge Plan     Row Name 10/19/21 1240       Plan    Plan Comments DC orders noted.  S/W [facility] - bed remains avaiable.  S/W pt and her dtr who are in agreement with DC to skilled bed at [facility] today.  Rx x1 copied and placed in packet.  DC summary and DC packet given to RN. Yellow cab w/c van arranged for today at 1600.  Family covered cost of transport.     Final Note DC to skilled bed at [facility]. .......sk    Row Name 10/19/21 3879       Plan    Plan Plan is skilled bed at [facility]    Plan Comments S//W [facility]/ [facility] who states pt is accepted at [facility] and bed is available today if ready. S/W [facility]/ [facility] who spoke w/ pt and her dtr.  They prefer [facility].  CCP spoke w/ pt and her dtr who confirm they prefer [facility] instead of [facility]. MD notified. ........sk               Discharge Codes    No documentation.               Expected Discharge Date and Time     Expected Discharge Date Expected Discharge Time    Oct 19, 2021             Lindsay Duran RN

## 2021-10-19 NOTE — PROGRESS NOTES
[facility]  Pt states plan is to go to Acute Rehab then to a subacute rehab after just as she did previously at [facility]. Informed pt to follow Medicare guidelines, plan needed to be home with a safe dc plan. Permission given by pt for me to call dgt. Spoke with dgt Dariela Alfonso. States she works 3 nights a week 7p-7a and then sleeps during day. States she was looking at a longer stay for rehab as she does not feel pt will be ready to come home in 2 weeks. States her 1st choice is [facility].   CCP Lindsay informed. Will sign off    Claribel Watt RN  Acute Rehab Admission Nurse

## 2021-10-19 NOTE — CASE MANAGEMENT/SOCIAL WORK
Continued Stay Note  [facility]     Patient Name: Dionne Chavez  MRN: [id number]  Today's Date: 10/19/2021    Admit Date: 10/15/2021     Discharge Plan     Row Name 10/19/21 1144       Plan    Plan Plan is skilled bed at [facility]    Plan Comments S//W [facility]/ [facility] who states pt is accepted at [facility] and bed is available today if ready. S/W [facility]/ BECKY who spoke w/ pt and her dtr.  They prefer [facility].  [facility] spoke w/ pt and her dtr who confirm they prefer [facility] instead of [facility] [place]. MD notified. ........sk               Discharge Codes    No documentation.               Expected Discharge Date and Time     Expected Discharge Date Expected Discharge Time    Oct 19, 2021             Lindsay Duran RN

## 2021-10-19 NOTE — DISCHARGE SUMMARY
Patient Name: Dionne Chavez  : 1931  MRN: [id number]    Date of Admission: 10/15/2021  Date of Discharge:  10/19/2021  Primary Care Physician: Chantelle Rodriguez APRN      Chief Complaint:   Extremity Weakness      Discharge Diagnoses     Active Hospital Problems    Diagnosis  POA   • **Transient ischemic attack (TIA) [G45.9]  Yes   • Seizure disorder (HCC) [G40.909]  Yes   • Aneurysm (HCC) [I72.9]  Yes   • Hyperlipidemia [E78.5]  Yes   • History of CVA (cerebrovascular accident) [Z86.73]  Not Applicable   • HTN (hypertension) [I10]  Yes      Resolved Hospital Problems   No resolved problems to display.        Hospital Course     Very pleasant 89yo woman with h/o left-sided CVA with residual right hemiparesis about 5 years ago, who was living in SNF until about a week ago when she moved back into her own home with family. Last week she began to suffer some worsening of her right-sided weakness, but did not tell anyone. She then woke from sleep day of admission with her right-sided weakness much worse and presented to ER. 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    Day of Discharge     Subjective:  Feeling well today.  Right-sided weakness slightly improved again today but still not back to her baseline. No HA or visual changes. Tolerating po. Voiding well. +BM. No SOA or CP or palp.    Physical Exam:  Temp:  [97.6 °F (36.4 °C)-98.6 °F (37 °C)] 97.6 °F (36.4 °C)  Heart Rate:  [81-86] 86  Resp:  [16] 16  BP: (116-133)/(51-99) 123/60  Body mass index is 27.63 kg/m².  Physical Exam  Vitals and nursing note reviewed.   Constitutional:       General: She is not in acute distress.     Appearance: She is not ill-appearing, toxic-appearing or diaphoretic.   HENT:      Head: Normocephalic and atraumatic.      Right Ear: External ear normal.      Left Ear: External ear normal.      Nose: Nose normal.      Mouth/Throat:      Mouth: Mucous membranes are moist.      Pharynx: Oropharynx is clear.   Eyes:      General: No scleral icterus.        Right eye: No discharge.         Left eye: No discharge.      Extraocular Movements: Extraocular movements intact.      Conjunctiva/sclera: Conjunctivae normal.   Cardiovascular:      Rate and Rhythm: Normal rate and regular rhythm.      Pulses: Normal pulses.      Heart sounds: Normal heart sounds.   Pulmonary:      Effort: Pulmonary effort is normal. No respiratory distress.      Breath sounds: Normal breath sounds.   Abdominal:      General: Bowel sounds are normal. There is no distension.      Palpations: Abdomen is soft.      Tenderness: There is no abdominal tenderness.   Musculoskeletal:         General: Swelling (trace edema in BLEs, chronic) present. Normal range of motion.      Cervical back: Neck supple. No rigidity.   Lymphadenopathy:      Cervical: No cervical adenopathy.   Skin:     General: Skin is warm and dry.      Capillary Refill: Capillary refill takes less than 2 seconds.      Coloration: Skin is not jaundiced.      Findings: No rash.   Neurological:      Mental Status: She is alert and oriented to person, place, and time.      Cranial Nerves: No cranial nerve deficit.      Comments:  Right sided weakness in UE and LE  Very mild right facial weakness   Psychiatric:         Mood and Affect: Mood normal.         Behavior: Behavior normal.         Thought Content: Thought content normal.      Comments: Very pleasant as always            Consultants     Consult Orders (all) (From admission, onward)     Start     Ordered    10/18/21 1131  Inpatient Rehab Admission Consult  Once        Provider:  (Not yet assigned)    10/18/21 1131    10/16/21 1504  Inpatient Neurosurgery Consult  Once        Specialty:  Neurosurgery  Provider:  Lg Bill MD    10/16/21 1504    10/16/21 0407  Inpatient Advance Care Planning Consult  Once        Comments: Patient and family a DNR advanced directive   Provider:  (Not yet assigned)    10/16/21 0406    10/16/21 0405  Inpatient Advance Care Planning Consult  Once,   Status:  Canceled        Comments: Patient and family wants to be formulate a DNR directive   Provider:  (Not yet assigned)    10/16/21 0405    10/15/21 2012  Notify Stroke Coordinator  Once        Provider:  (Not yet assigned)    10/15/21 2013    10/15/21 2012  Inpatient Rehab Admission Consult  Once        Provider:  (Not yet assigned)    10/15/21 2013    10/15/21 2012  Consult to Case Management   Once        Provider:  (Not yet assigned)    10/15/21 2013    10/15/21 2012  Consult to Diabetes Educator  Once,   Status:  Canceled        Provider:  (Not yet assigned)    10/15/21 2013    10/15/21 2012  Inpatient Neurology Consult Stroke  Once        Specialty:  Neurology  Provider:  Lindy Vincent MD    10/15/21 2013    10/15/21 1610  [facility] (on-call MD unless specified) Details  Once        Specialty:  Hospitalist  Provider:  Garry Childers MD    10/15/21 1609              Procedures     * Surgery not found *      Imaging Results (All)     Procedure Component Value Units Date/Time    MRI Brain Without Contrast [[id number]] Collected: 10/17/21 1306     Updated: 10/17/21 1353    Narrative:       BRAIN MRI WITHOUT CONTRAST     HISTORY: Evaluate for stroke. Right-sided weakness. Possible hemorrhage.     TECHNIQUE: Noncontrast brain MRI is correlated with head CT 10/15/2021.     FINDINGS: The ventricles are normal in caliber. There is no restricted  diffusion to suggest the presence of any acute stroke. Extensive  increased T2 signal is observed in the periventricular white matter and  there is encephalomalacia along the para midline posterosuperior left  frontal lobe measuring about 5 cm AP and 17 mm transverse. There is a 14  x 9 mm diameter abnormal flow void contiguous with the upper portion of  the right vertebral artery and corresponding to the abnormality seen on  the head CT. This almost certainly represents an aneurysm. The right  middle cranial fossa extra-axial lesion that was demonstrated on the CT  is not well demonstrated on this exam. The addition of contrast might  make this more apparent. The extra-axial spaces appear normal. A 4.5 cm  cystic lesion in the left occipital scalp is noted as well. Paranasal  sinuses are clear and the orbital structures appear normal.       Impression:      1. No evidence of acute infarction.  2. 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  3. 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 This measures  about 14 x 9 mm diameter.  4. Partially calcified extra-axial lesion seen in the right middle  cranial fossa inferolaterally on CT is not well demonstrated on this  noncontrast brain MRI.     This report was finalized on 10/17/2021 1:50 PM by Dr. Jere Colmenares M.D.       CT Head Without Contrast [[id number]] Collected: 10/15/21 1609     Updated: 10/15/21 1630    Narrative:      CT HEAD WITHOUT CONTRAST     CLINICAL HISTORY: Acute onset right-sided weakness.     TECHNIQUE: CT scan of the head was obtained with 3 mm axial soft tissue  algorithm  and 2 mm bone algorithm images. No intravenous contrast was  administered. Sagittal and coronal reconstructions were obtained.     COMPARISON: No previous similar studies are available for comparison.     FINDINGS:       There is no evidence for a calvarial fracture. There is no evidence for  an acute extra-axial hemorrhage.     Within the inferior aspect of the right middle cranial fossa, there is a  partially calcified lesion that is likely extra-axial in nature and  measures up to approximately 2.7 x 1.4 x 1.5 cm in greatest dimensions  that is likely representative of a partially calcified meningioma.  Additionally, within the posterior fossa, anterior to the inferior  portion of the right cerebellar hemisphere and right lateral to the  pontomedullary junction, there is another extra-axial lesion which  measures up to approximately 1.7 x 1.0 cm in greatest axial dimensions.  This lesion is peripherally calcified and is within the immediate  vicinity of the right vertebral artery. I suspect that the findings are  representative of a large aneurysm arising from the right vertebral  artery, although another potential possibility would be a meningioma at  this site. I recommend further evaluation with either a CT angiogram or  MRI/MRA study.     The ventricles, sulci, and cisterns are age appropriate. There is a  focus of encephalomalacia within the medial portion of the left frontal  lobe which measures up to approximately 6.0 x 5.6 x 3.0 cm in greatest  dimensions that is compatible with a chronic infarct within the left BIJAN  distribution. There is an age-indeterminate lacune within the left  lentiform nucleus which measures up to approximately 9 mm in diameter.  There are mild-to-moderate changes of chronic small vessel ischemic  phenomena. A chronic lacune is identified within the left caudate body  extending to the adjacent corona radiata.     The extracranial structures are incidentally notable for a  large  sebaceous cyst within the left occipital scalp which measures up to  approximately 1.7 x 4.3 cm in greatest axial dimensions.       Impression:         There is an age-indeterminate lacune within the left lentiform nucleus.  Otherwise, there is no convincing evidence to suggest an acute  intracranial abnormality.     There are findings compatible with a chronic infarct within the medial  portion of the left frontal lobe within the left BIJAN distribution and  measures up to approximately 6.0 x 5.6 x 3.0 cm in greatest dimensions.  Old lacunar disease is seen within the left caudate extending into the  adjacent corona radiata.     There is a partially calcified mass within the inferior aspect of the  right middle cranial fossa that is likely extra-axial in nature and  likely representative of a partially calcified meningioma. Additionally,  within the posterior fossa just anterior to the inferior aspect of the  right cerebellar hemisphere along the right lateral aspect of the  pontomedullary junction, there is an extra-axial peripherally calcified  lesion that measures up to 1.7 x 1.0 cm in greatest axial dimensions.  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 Further evaluation could be obtained with either an MRA/MRA  study or a CT angiogram.     Incidental note is made of a large sebaceous cyst within the left  occipital scalp.           Radiation dose reduction techniques were utilized, including automated  exposure control and exposure modulation based on body size.     This report was finalized on 10/15/2021 4:27 PM by Dr. Alfredo Adame M.D.           Results for orders placed during the hospital encounter of 10/15/21    Duplex Carotid Ultrasound CAR    Interpretation Summary  · Right internal carotid artery is normal.  ·  Left internal carotid artery plaque without significant stenosis.    Results for orders placed during the hospital encounter of 10/15/21    Adult transthoracic echo complete    Interpretation Summary  · Calculated left ventricular EF = 61.6% Estimated left ventricular EF was in agreement with the calculated left ventricular EF. Left ventricular systolic function is normal.  · Left ventricular diastolic function is consistent with (grade I) impaired relaxation.  · Trace tricuspid valve regurgitation is present.  · Estimated right ventricular systolic pressure from tricuspid regurgitation is normal (<35 mmHg).  · Saline test results are negative.    Pertinent Labs     Results from last 7 days   Lab Units 10/18/21  0906 10/17/21  0705 10/16/21  0720 10/15/21  1402   WBC 10*3/mm3 7.18 7.40 7.03 8.29   HEMOGLOBIN g/dL 12.5 12.5 12.3 12.8   PLATELETS 10*3/mm3 357 341 369 352     Results from last 7 days   Lab Units 10/18/21  0906 10/17/21  0705 10/16/21  0720 10/15/21  1402   SODIUM mmol/L 136 141 141 138   POTASSIUM mmol/L 4.0 4.0 4.0 4.0   CHLORIDE mmol/L 99 105 105 101   CO2 mmol/L 25.1 25.6 25.9 25.0   BUN mg/dL 17 14 11 13   CREATININE mg/dL 0.96 0.88 0.85 0.92   GLUCOSE mg/dL 104* 92 91 111*   Estimated Creatinine Clearance: 36.8 mL/min (by C-G formula based on SCr of 0.96 mg/dL).  Results from last 7 days   Lab Units 10/17/21  0705 10/16/21  0720 10/15/21  1402   ALBUMIN g/dL 3.90 4.30 4.80   BILIRUBIN mg/dL 0.3 0.5 0.3   ALK PHOS U/L 60 63 73   AST (SGOT) U/L 14 16 17   ALT (SGPT) U/L 11 11 15     Results from last 7 days   Lab Units 10/18/21  0906 10/17/21  0705 10/16/21  0720 10/15/21  1402   CALCIUM mg/dL 8.7 8.5 8.7 9.5   ALBUMIN g/dL  --  3.90 4.30 4.80   MAGNESIUM mg/dL 2.2 2.2  --   --        Results from last 7 days   Lab Units 10/15/21  1402   TROPONIN T ng/mL <0.010       Results from last 7 days   Lab Units 10/16/21  0720   CHOLESTEROL mg/dL 122   TRIGLYCERIDES mg/dL 95   HDL CHOL mg/dL 66*   LDL CHOL  mg/dL 38         Results from last 7 days   Lab Units 10/15/21  1402   COVID19  Not Detected       Test Results Pending at Discharge       Discharge Details        Discharge Medications      New Medications      Instructions Start Date   amLODIPine 10 MG tablet  Commonly known as: NORVASC   10 mg, Oral, Nightly         Continue These Medications      Instructions Start Date   Aspirin Buf(CaCarb-MgCarb-MgO) 81 MG tablet   81 mg, Oral, Daily      gabapentin 100 MG capsule  Commonly known as: NEURONTIN   200 mg, Oral, 3 Times Daily      levETIRAcetam 250 MG tablet  Commonly known as: KEPPRA   250 mg, Oral, 2 Times Daily      Metoprolol Succinate 50 MG capsule extended-release 24 hour sprinkle   Take 50 mg by mouth.      multivitamin tablet tablet   1 tablet, Oral, Daily      Plavix 75 MG tablet  Generic drug: clopidogrel   75 mg, Oral, Daily      POTASSIUM CHLORIDE ER PO   20 mEq, Oral, Daily      rosuvastatin 5 MG tablet  Commonly known as: CRESTOR   5 mg, Oral, Nightly      vitamin B-12 1000 MCG tablet  Commonly known as: CYANOCOBALAMIN   1,000 mcg, Oral, Daily      vitamin D3 125 MCG (5000 UT) capsule capsule   2,000 Units, Oral, Daily         Stop These Medications    amLODIPine-atorvastatin 10-10 MG per tablet  Commonly known as: CADUET            Allergies   Allergen Reactions   • Ether Other (See Comments)     Pass out       Discharge Disposition:  Skilled Nursing Facility (DC - External)      Discharge Diet:  Diet Order   Procedures   • Diet Regular; Cardiac       Discharge Activity:   WBAT, up with assistance only, further recs per PT at facility    CODE STATUS:    Code Status and Medical Interventions:   Ordered at: 10/15/21 2013     Code Status:    CPR     Medical Interventions (Level of Support Prior to Arrest):    Full       No future appointments.  Additional Instructions for the Follow-ups that You Need to Schedule     Discharge Follow-up with PCP   As directed       Currently Documented PCP:     Chantelle Rodriguez APRN    PCP Phone Number:    [phone]     Follow Up Details: Jennifer NP (PCP) in 2 weeks         Discharge Follow-up with Specified Provider: [facility]; 3 Months   As directed      To: [facility]    Follow Up: 3 Months            Contact information for follow-up providers     Chantelle Rodriguez APRN .    Specialty: Nurse Practitioner  Why: Jennifer NP (PCP) in 2 weeks  Contact information:  [address] [address]  [phone]                   Contact information for after-discharge care     Destination     [facility] .    Service: Skilled Nursing  Contact information:  [address] [facility] [address] [phone]                             Additional Instructions for the Follow-ups that You Need to Schedule     Discharge Follow-up with PCP   As directed       Currently Documented PCP:    Chantelle Rodriguez APRN    PCP Phone Number:    [phone]     Follow Up Details: Jennifer NP (PCP) in 2 weeks         Discharge Follow-up with Specified Provider: [facility]; 3 Months   As directed      To: [facility]    Follow Up: 3 Months           Time Spent on Discharge:  Greater than 30 minutes      Jere Floyd MD  [facility]  10/19/21  12:05 EDT

## 2021-10-19 NOTE — CASE MANAGEMENT/SOCIAL WORK
Case Management Discharge Note      Final Note: DC to skilled bed at [facility]. .......sk    Provided Post Acute Provider List?: Yes  Post Acute Provider List: Nursing Home  Provided Post Acute Provider Quality & Resource List?: Yes  Post Acute Provider Quality and Resource List: Nursing Home  Delivered To: Patient  Method of Delivery: In person    Selected Continued Care - Discharged on 10/19/2021 Admission date: 10/15/2021 - Discharge disposition: Skilled Nursing Facility (DC - External)    Destination Coordination complete.    Service Provider Selected Services Address Phone Fax Patient Preferred    [facility]  Skilled Nursing [address] [facility] [address] [phone] --          Durable Medical Equipment    No services have been selected for the patient.              Dialysis/Infusion    No services have been selected for the patient.              Home Medical Care    No services have been selected for the patient.              Therapy    No services have been selected for the patient.              Community Resources    No services have been selected for the patient.              Community & DME    No services have been selected for the patient.                  Transportation Services  W/C Van: [employer]    Final Discharge Disposition Code: 03 - skilled nursing facility (SNF) ([facility])